# Patient Record
Sex: FEMALE | Race: WHITE | NOT HISPANIC OR LATINO | Employment: UNEMPLOYED | URBAN - METROPOLITAN AREA
[De-identification: names, ages, dates, MRNs, and addresses within clinical notes are randomized per-mention and may not be internally consistent; named-entity substitution may affect disease eponyms.]

---

## 2023-09-26 PROBLEM — N32.81 URGENCY-FREQUENCY SYNDROME: Status: ACTIVE | Noted: 2023-09-26

## 2023-09-26 PROBLEM — R33.9 INCOMPLETE EMPTYING OF BLADDER: Status: ACTIVE | Noted: 2023-09-26

## 2023-09-26 PROBLEM — R59.0 CERVICAL LYMPHADENOPATHY: Status: ACTIVE | Noted: 2023-09-26

## 2023-09-26 PROBLEM — R11.0 NAUSEA IN ADULT: Status: ACTIVE | Noted: 2023-09-26

## 2023-09-26 PROBLEM — K21.9 GERD (GASTROESOPHAGEAL REFLUX DISEASE): Status: ACTIVE | Noted: 2023-09-26

## 2023-09-26 PROBLEM — K80.20 CHOLELITHIASIS: Status: ACTIVE | Noted: 2023-09-26

## 2023-09-26 PROBLEM — R10.11 INTERMITTENT RIGHT UPPER QUADRANT ABDOMINAL PAIN: Status: ACTIVE | Noted: 2023-09-26

## 2023-09-26 PROBLEM — R74.8 ELEVATED LIVER ENZYMES: Status: ACTIVE | Noted: 2023-09-26

## 2023-09-26 PROBLEM — R13.10 DYSPHAGIA: Status: ACTIVE | Noted: 2023-09-26

## 2023-09-26 PROBLEM — R91.8 MULTIPLE LUNG NODULES: Status: ACTIVE | Noted: 2023-09-26

## 2023-09-26 PROBLEM — R10.13 EPIGASTRIC PAIN: Status: ACTIVE | Noted: 2023-09-26

## 2023-09-26 PROBLEM — F17.210 CIGARETTE SMOKER: Status: ACTIVE | Noted: 2023-09-26

## 2023-09-26 PROBLEM — R07.9 CHEST PAIN: Status: ACTIVE | Noted: 2023-09-26

## 2023-09-26 PROBLEM — Z87.19 HX OF DUODENAL ULCER: Status: ACTIVE | Noted: 2023-09-26

## 2023-09-26 PROBLEM — R21 RASH, SKIN: Status: ACTIVE | Noted: 2023-09-26

## 2023-09-26 PROBLEM — R14.3 FLATULENCE: Status: ACTIVE | Noted: 2023-09-26

## 2023-09-26 PROBLEM — L02.91 ABSCESS: Status: ACTIVE | Noted: 2023-09-26

## 2023-09-26 PROBLEM — Z86.0100 PERSONAL HISTORY OF COLONIC POLYPS: Status: ACTIVE | Noted: 2023-09-26

## 2023-09-26 PROBLEM — R31.9 HEMATURIA: Status: ACTIVE | Noted: 2023-09-26

## 2023-09-26 PROBLEM — K59.09 CHRONIC CONSTIPATION: Status: ACTIVE | Noted: 2023-09-26

## 2023-09-26 PROBLEM — Z86.010 PERSONAL HISTORY OF COLONIC POLYPS: Status: ACTIVE | Noted: 2023-09-26

## 2023-09-26 PROBLEM — R06.02 SHORTNESS OF BREATH: Status: ACTIVE | Noted: 2023-09-26

## 2023-09-26 PROBLEM — B18.2 CHRONIC HEPATITIS C (MULTI): Status: ACTIVE | Noted: 2023-09-26

## 2023-09-26 PROBLEM — R10.13 INTRACTABLE EPIGASTRIC ABDOMINAL PAIN: Status: ACTIVE | Noted: 2023-09-26

## 2023-09-26 RX ORDER — ALBUTEROL SULFATE 90 UG/1
1-2 AEROSOL, METERED RESPIRATORY (INHALATION) EVERY 4 HOURS PRN
COMMUNITY
End: 2024-02-13 | Stop reason: ALTCHOICE

## 2023-09-26 RX ORDER — ARIPIPRAZOLE 5 MG/1
TABLET ORAL
COMMUNITY
Start: 2017-06-18

## 2023-09-26 RX ORDER — ACETAMINOPHEN 325 MG/1
TABLET ORAL
COMMUNITY
End: 2024-02-13 | Stop reason: ALTCHOICE

## 2023-09-26 RX ORDER — WHEAT DEXTRIN 3 G/3.5 G
POWDER IN PACKET (EA) ORAL
COMMUNITY
Start: 2021-12-29 | End: 2024-02-13 | Stop reason: ALTCHOICE

## 2023-09-26 RX ORDER — GABAPENTIN 300 MG/1
1 CAPSULE ORAL 3 TIMES DAILY
COMMUNITY
End: 2024-02-13 | Stop reason: ALTCHOICE

## 2023-09-26 RX ORDER — OMEPRAZOLE 40 MG/1
40 CAPSULE, DELAYED RELEASE ORAL
COMMUNITY
Start: 2021-12-29 | End: 2024-02-13 | Stop reason: ALTCHOICE

## 2023-09-26 RX ORDER — VENLAFAXINE HYDROCHLORIDE 150 MG/1
300 CAPSULE, EXTENDED RELEASE ORAL
COMMUNITY

## 2023-09-26 RX ORDER — ARIPIPRAZOLE 15 MG/1
1 TABLET ORAL DAILY
COMMUNITY

## 2023-09-26 RX ORDER — CHLORHEXIDINE GLUCONATE ORAL RINSE 1.2 MG/ML
15 SOLUTION DENTAL AS NEEDED
COMMUNITY
End: 2024-02-13 | Stop reason: ALTCHOICE

## 2023-09-26 RX ORDER — CYCLOBENZAPRINE HCL 10 MG
10 TABLET ORAL NIGHTLY
COMMUNITY

## 2023-09-26 RX ORDER — CYCLOBENZAPRINE HCL 5 MG
5 TABLET ORAL 3 TIMES DAILY
COMMUNITY
End: 2024-03-08 | Stop reason: HOSPADM

## 2023-09-26 RX ORDER — LIDOCAINE HYDROCHLORIDE 20 MG/ML
5 SOLUTION OROPHARYNGEAL 4 TIMES DAILY
COMMUNITY
Start: 2022-07-21 | End: 2024-02-13 | Stop reason: ALTCHOICE

## 2023-09-26 RX ORDER — IBUPROFEN 800 MG/1
800 TABLET ORAL 3 TIMES DAILY
COMMUNITY

## 2023-09-26 RX ORDER — UMECLIDINIUM BROMIDE AND VILANTEROL TRIFENATATE 62.5; 25 UG/1; UG/1
1 POWDER RESPIRATORY (INHALATION) DAILY
COMMUNITY
End: 2024-02-13 | Stop reason: ALTCHOICE

## 2023-09-26 RX ORDER — POLYETHYLENE GLYCOL 3350 17 G/17G
17 POWDER, FOR SOLUTION ORAL DAILY
COMMUNITY
Start: 2021-12-29

## 2023-09-26 RX ORDER — GABAPENTIN 600 MG/1
600 TABLET ORAL 3 TIMES DAILY
COMMUNITY

## 2023-10-02 ENCOUNTER — HOSPITAL ENCOUNTER (OUTPATIENT)
Dept: GASTROENTEROLOGY | Facility: HOSPITAL | Age: 48
Discharge: HOME | End: 2023-10-02
Payer: COMMERCIAL

## 2023-10-02 ENCOUNTER — ANESTHESIA (OUTPATIENT)
Dept: GASTROENTEROLOGY | Facility: HOSPITAL | Age: 48
End: 2023-10-02
Payer: COMMERCIAL

## 2023-10-02 ENCOUNTER — ANESTHESIA EVENT (OUTPATIENT)
Dept: GASTROENTEROLOGY | Facility: HOSPITAL | Age: 48
End: 2023-10-02
Payer: COMMERCIAL

## 2023-10-02 VITALS
SYSTOLIC BLOOD PRESSURE: 105 MMHG | OXYGEN SATURATION: 96 % | BODY MASS INDEX: 28.79 KG/M2 | DIASTOLIC BLOOD PRESSURE: 60 MMHG | RESPIRATION RATE: 16 BRPM | HEART RATE: 61 BPM | TEMPERATURE: 97.1 F | WEIGHT: 190 LBS | HEIGHT: 68 IN

## 2023-10-02 DIAGNOSIS — K21.9 GASTRO-ESOPHAGEAL REFLUX DISEASE WITHOUT ESOPHAGITIS: Primary | ICD-10-CM

## 2023-10-02 PROBLEM — R10.13 ABDOMINAL PAIN, ACUTE, EPIGASTRIC: Status: ACTIVE | Noted: 2023-10-02

## 2023-10-02 PROCEDURE — 2720000007 HC OR 272 NO HCPCS

## 2023-10-02 PROCEDURE — 2500000005 HC RX 250 GENERAL PHARMACY W/O HCPCS: Performed by: NURSE ANESTHETIST, CERTIFIED REGISTERED

## 2023-10-02 PROCEDURE — 7100000009 HC PHASE TWO TIME - INITIAL BASE CHARGE

## 2023-10-02 PROCEDURE — 88305 TISSUE EXAM BY PATHOLOGIST: CPT | Mod: TC,SUR,PORLAB | Performed by: SURGERY

## 2023-10-02 PROCEDURE — 3700000002 HC GENERAL ANESTHESIA TIME - EACH INCREMENTAL 1 MINUTE

## 2023-10-02 PROCEDURE — 88305 TISSUE EXAM BY PATHOLOGIST: CPT | Performed by: PATHOLOGY

## 2023-10-02 PROCEDURE — 2500000004 HC RX 250 GENERAL PHARMACY W/ HCPCS (ALT 636 FOR OP/ED): Performed by: NURSE ANESTHETIST, CERTIFIED REGISTERED

## 2023-10-02 PROCEDURE — 3700000001 HC GENERAL ANESTHESIA TIME - INITIAL BASE CHARGE

## 2023-10-02 PROCEDURE — 7100000010 HC PHASE TWO TIME - EACH INCREMENTAL 1 MINUTE

## 2023-10-02 PROCEDURE — 43239 EGD BIOPSY SINGLE/MULTIPLE: CPT | Performed by: SURGERY

## 2023-10-02 RX ORDER — FENTANYL CITRATE 50 UG/ML
INJECTION, SOLUTION INTRAMUSCULAR; INTRAVENOUS AS NEEDED
Status: DISCONTINUED | OUTPATIENT
Start: 2023-10-02 | End: 2023-10-02

## 2023-10-02 RX ORDER — SODIUM CHLORIDE, SODIUM LACTATE, POTASSIUM CHLORIDE, CALCIUM CHLORIDE 600; 310; 30; 20 MG/100ML; MG/100ML; MG/100ML; MG/100ML
20 INJECTION, SOLUTION INTRAVENOUS CONTINUOUS
Status: DISCONTINUED | OUTPATIENT
Start: 2023-10-02 | End: 2023-10-10

## 2023-10-02 RX ORDER — LIDOCAINE HYDROCHLORIDE 20 MG/ML
INJECTION, SOLUTION INFILTRATION; PERINEURAL AS NEEDED
Status: DISCONTINUED | OUTPATIENT
Start: 2023-10-02 | End: 2023-10-02

## 2023-10-02 RX ORDER — PROPOFOL 10 MG/ML
INJECTION, EMULSION INTRAVENOUS AS NEEDED
Status: DISCONTINUED | OUTPATIENT
Start: 2023-10-02 | End: 2023-10-02

## 2023-10-02 RX ADMIN — PROPOFOL 30 MG: 10 INJECTION, EMULSION INTRAVENOUS at 13:04

## 2023-10-02 RX ADMIN — SODIUM CHLORIDE: 9 INJECTION, SOLUTION INTRAVENOUS at 12:58

## 2023-10-02 RX ADMIN — PROPOFOL 50 MG: 10 INJECTION, EMULSION INTRAVENOUS at 13:01

## 2023-10-02 RX ADMIN — FENTANYL CITRATE 100 MCG: 50 INJECTION, SOLUTION INTRAMUSCULAR; INTRAVENOUS at 13:01

## 2023-10-02 RX ADMIN — LIDOCAINE HYDROCHLORIDE 100 ML: 20 INJECTION, SOLUTION INFILTRATION; PERINEURAL at 13:01

## 2023-10-02 RX ADMIN — PROPOFOL 20 MG: 10 INJECTION, EMULSION INTRAVENOUS at 13:06

## 2023-10-02 SDOH — HEALTH STABILITY: MENTAL HEALTH: CURRENT SMOKER: 1

## 2023-10-02 ASSESSMENT — PAIN SCALES - GENERAL
PAINLEVEL_OUTOF10: 0 - NO PAIN
PAINLEVEL_OUTOF10: 0 - NO PAIN
PAIN_LEVEL: 0
PAINLEVEL_OUTOF10: 0 - NO PAIN

## 2023-10-02 ASSESSMENT — PAIN - FUNCTIONAL ASSESSMENT
PAIN_FUNCTIONAL_ASSESSMENT: 0-10

## 2023-10-02 ASSESSMENT — COLUMBIA-SUICIDE SEVERITY RATING SCALE - C-SSRS
6. HAVE YOU EVER DONE ANYTHING, STARTED TO DO ANYTHING, OR PREPARED TO DO ANYTHING TO END YOUR LIFE?: NO
2. HAVE YOU ACTUALLY HAD ANY THOUGHTS OF KILLING YOURSELF?: NO
6. HAVE YOU EVER DONE ANYTHING, STARTED TO DO ANYTHING, OR PREPARED TO DO ANYTHING TO END YOUR LIFE?: YES
1. IN THE PAST MONTH, HAVE YOU WISHED YOU WERE DEAD OR WISHED YOU COULD GO TO SLEEP AND NOT WAKE UP?: NO

## 2023-10-02 NOTE — ANESTHESIA PREPROCEDURE EVALUATION
Patient: Jaya Billy    Procedure Information       Anesthesia Start Date/Time: 10/02/23 1259    Scheduled providers: Beverly Cardenas MD    Procedure: EGD    Location: [facility]            Relevant Problems   Cardiovascular   (+) Chest pain      GI   (+) GERD (gastroesophageal reflux disease)      /Renal   (+) Chronic hepatitis C (CMS/HCC)      Pulmonary   (+) Multiple lung nodules      GI/Hepatic   (+) Cholelithiasis   (+) Chronic hepatitis C (CMS/HCC)      Infectious Disease   (+) Chronic hepatitis C (CMS/HCC)      Other   (+) Cervical lymphadenopathy       Clinical information reviewed:   Tobacco  Allergies  Meds   Med Hx  Surg Hx  OB Status  Fam Hx  Soc   Hx        NPO Detail:  NPO/Void Status  Date of Last Liquid: 10/01/23  Time of Last Liquid: 2000  Date of Last Solid: 10/01/23  Time of Last Solid: 2200         Physical Exam    Airway  Mallampati: III     Cardiovascular   Rhythm: regular  Rate: normal     Dental    Pulmonary    Abdominal            Anesthesia Plan    ASA 2     MAC     The patient is a current smoker.  Patient did not smoke on day of procedure.    intravenous induction   Anesthetic plan and risks discussed with patient.

## 2023-10-02 NOTE — H&P
History Of Present Illness  Jaya Billy is a 47 y.o. female presenting with abd pain.     Past Medical History  She has a past medical history of Anxiety disorder, unspecified, Constipation, Dysphagia, Encounter for gynecological examination (general) (routine) without abnormal findings, Encounter for screening mammogram for malignant neoplasm of breast, Gastric ulcer, Hyperlipidemia, Infectious viral hepatitis, MRSA (methicillin resistant Staphylococcus aureus), Other chronic pain, Other conditions influencing health status, Personal history of diseases of the blood and blood-forming organs and certain disorders involving the immune mechanism, Personal history of other diseases of the digestive system (2021), Personal history of other diseases of the musculoskeletal system and connective tissue, Personal history of other diseases of the musculoskeletal system and connective tissue, Personal history of other diseases of the musculoskeletal system and connective tissue, Personal history of other diseases of the respiratory system, Personal history of other endocrine, nutritional and metabolic disease, Personal history of other mental and behavioral disorders, Rectal bleeding, and SOB (shortness of breath).    Surgical History  She has a past surgical history that includes Hysterectomy (2013); Elbow surgery (2013); Colonoscopy (2013); Other surgical history (2022); Other surgical history (2022); and  section, classic (2017).     Social History  She reports that she has been smoking cigarettes. She has a 13.00 pack-year smoking history. She has never used smokeless tobacco. She reports that she does not currently use alcohol. She reports current drug use. Drug: Marijuana.    Family History  Family History   Problem Relation Name Age of Onset    Depression Mother      Atrial fibrillation Father      Cancer Father      Depression Brother      Other (cardiac  "disorder) Maternal Grandmother      Colon cancer Paternal Grandmother      Diabetes Other uncle         Allergies  Amoxicillin, Ciprofloxacin, Morphine, Naproxen, and Nsaids (non-steroidal anti-inflammatory drug)    Review of Systems     Physical Exam     Last Recorded Vitals  Blood pressure 115/61, pulse 77, temperature 36.1 °C (97 °F), temperature source Temporal, resp. rate 16, height 1.727 m (5' 8\"), weight 86.2 kg (190 lb), SpO2 95 %.    Relevant Results        No results found for this or any previous visit (from the past 24 hour(s)).      Assessment/Plan   Active Problems:    Abdominal pain, acute, epigastric             I spent 15 minutes in the professional and overall care of this patient.      Beverly Cardenas MD    "

## 2023-10-02 NOTE — DISCHARGE INSTRUCTIONS
Patient Instructions after a Colonoscopy & Upper GI      The anesthetics, sedatives or narcotics which were given to you today will be acting in your body for the next 24 hours, so you might feel a little sleepy or groggy.  This feeling should slowly wear off. Carefully read and follow the instructions.     You received sedation today:  - Do not drive or operate any machinery or power tools of any kind.   - No alcoholic beverages today, not even beer or wine.  - Do not make any important decisions or sign any legal documents.  - No over the counter medications that contain alcohol or that may cause drowsiness.  - Do not make any important decisions or sign any legal documents.    While it is common to experience mild to moderate abdominal distention, gas, or belching after your procedure, if any of these symptoms occur following discharge from the GI Lab or within one week of having your procedure, call the [facility] to be advised whether a visit to your nearest Urgent Care or Emergency Department is indicated.  Take this paper with you if you go.     - If you develop an allergic reaction to the medications that were given during your procedure such as difficulty breathing, rash, hives, severe nausea, vomiting or lightheadedness.- If you experience chest pain, shortness of breath, severe abdominal pain, fevers and chills.    -If you develop signs and symptoms of bleeding such as blood in your spit, if your stools turn black, tarry, or bloody    - If you have not urinated within 8 hours following your procedure.- If your IV site becomes painful, red, inflamed, or looks infected.    If you received a biopsy/polypectomy/sphincterotomy the following instructions apply below:    - If you are taking Aspirin containing products, non-steroidal medications or anti-coagulants please follow doctors recommendations. (Examples of these types of medications are: Advil, Arthrotec, Aleve, Coumadin, Ecotrin,  Heparin, Ibuprofen, Indocin, Motrin, Naprosyn, Nuprin, Plavix, Vioxx, and Voltarin, or their generic forms.  This list is not all-inclusive.  Check with your physician or pharmacist before resuming medications.)     - Eat a soft diet today.  Avoid foods that are poorly digested for the next 24 hours.  These foods would include: nuts, beans, lettuce, red meats, and fried foods.      - Start with liquids and advance your diet as tolerated, gradually work up to eating solids.     - Do not have a Barium Study or Enema for one week.    Your physician recommends the additional following instructions:    Colonoscopy: Resume your previous diet, continue your present medications, a repeated colonoscopy will be determined based on pathology result. Return to normal activity tomorrow.           Nurse Signature                                                                        Date___________________                                                                            Patient/Responsible Party Signature                                        Date___________________

## 2023-10-02 NOTE — ANESTHESIA POSTPROCEDURE EVALUATION
Patient: Jaya Billy    Procedure Summary       Date: 10/02/23 Room / Location: [facility]    Anesthesia Start: 1259 Anesthesia Stop: 1312    Procedure: EGD Diagnosis:       Gastro-esophageal reflux disease without esophagitis      Gastro-esophageal reflux disease without esophagitis    Scheduled Providers: Beverly Cardenas MD Responsible Provider: MILTON Barber    Anesthesia Type: MAC ASA Status: 2            Anesthesia Type: MAC    Vitals Value Taken Time   /58 10/02/23 1315   Temp 98.6 10/02/23 1315   Pulse 64 10/02/23 1315   Resp 18 10/02/23 1315   SpO2 93 10/02/23 1315       Anesthesia Post Evaluation    Patient location during evaluation: bedside  Patient participation: complete - patient participated  Level of consciousness: awake  Pain score: 0  Pain management: adequate  Cardiovascular status: acceptable  Respiratory status: acceptable  Hydration status: acceptable        No notable events documented.

## 2023-10-09 LAB
LABORATORY COMMENT REPORT: NORMAL
PATH REPORT.FINAL DX SPEC: NORMAL
PATH REPORT.GROSS SPEC: NORMAL
PATH REPORT.TOTAL CANCER: NORMAL

## 2023-10-10 RX ORDER — SODIUM CHLORIDE 0.9 % (FLUSH) 0.9 %
10 SYRINGE (ML) INJECTION EVERY 12 HOURS SCHEDULED
Status: DISCONTINUED | OUTPATIENT
Start: 2023-10-10 | End: 2023-10-20 | Stop reason: HOSPADM

## 2023-10-10 RX ORDER — SODIUM CHLORIDE 0.9 % (FLUSH) 0.9 %
10 SYRINGE (ML) INJECTION AS NEEDED
Status: DISCONTINUED | OUTPATIENT
Start: 2023-10-10 | End: 2023-10-20 | Stop reason: HOSPADM

## 2024-02-05 PROBLEM — R51.9 ACUTE HEADACHE: Status: ACTIVE | Noted: 2024-02-05

## 2024-02-05 PROBLEM — M54.50 CHRONIC BILATERAL LOW BACK PAIN WITHOUT SCIATICA: Status: ACTIVE | Noted: 2017-11-27

## 2024-02-05 PROBLEM — F41.9 ANXIETY: Status: ACTIVE | Noted: 2022-11-01
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
## 2024-02-05 PROBLEM — M05.741 RHEUMATOID ARTHRITIS INVOLVING BOTH HANDS WITH POSITIVE RHEUMATOID FACTOR (MULTI): Status: ACTIVE | Noted: 2017-07-19
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
## 2024-02-05 PROBLEM — F41.9 ANXIETY AND DEPRESSION: Status: ACTIVE | Noted: 2017-07-19

## 2024-02-05 PROBLEM — H10.30 ACUTE CONJUNCTIVITIS: Status: ACTIVE | Noted: 2024-02-05

## 2024-02-05 PROBLEM — G89.29 CHRONIC BILATERAL LOW BACK PAIN WITHOUT SCIATICA: Status: ACTIVE | Noted: 2017-11-27

## 2024-02-05 PROBLEM — M75.40 IMPINGEMENT SYNDROME OF SHOULDER REGION: Status: ACTIVE | Noted: 2020-07-13

## 2024-02-05 PROBLEM — F32.A ANXIETY AND DEPRESSION: Status: ACTIVE | Noted: 2017-07-19

## 2024-02-05 PROBLEM — M05.742 RHEUMATOID ARTHRITIS INVOLVING BOTH HANDS WITH POSITIVE RHEUMATOID FACTOR (MULTI): Status: ACTIVE | Noted: 2017-07-19

## 2024-02-05 PROBLEM — J42 CHRONIC BRONCHITIS (MULTI): Status: ACTIVE | Noted: 2022-11-30

## 2024-02-05 PROBLEM — M54.2 NECK PAIN: Status: ACTIVE | Noted: 2022-03-15

## 2024-02-05 PROBLEM — M79.7 FIBROMYALGIA: Status: ACTIVE | Noted: 2017-07-19

## 2024-02-05 PROBLEM — J30.1 SEASONAL ALLERGIC RHINITIS DUE TO POLLEN: Status: ACTIVE | Noted: 2018-07-10

## 2024-02-05 PROBLEM — M50.30 DEGENERATION OF INTERVERTEBRAL DISC OF CERVICAL REGION: Status: ACTIVE | Noted: 2021-07-01

## 2024-02-05 PROBLEM — K26.9 DUODENAL ULCER: Status: ACTIVE | Noted: 2024-02-05

## 2024-02-05 PROBLEM — Z86.2 HISTORY OF ANEMIA: Status: ACTIVE | Noted: 2024-02-05

## 2024-02-05 PROBLEM — U07.1 DISEASE DUE TO SEVERE ACUTE RESPIRATORY SYNDROME CORONAVIRUS 2 (SARS-COV-2): Status: ACTIVE | Noted: 2024-02-05

## 2024-02-05 PROBLEM — J01.90 ACUTE SINUSITIS: Status: ACTIVE | Noted: 2024-02-05

## 2024-02-05 PROBLEM — Z86.39 HISTORY OF ELEVATED LIPIDS: Status: ACTIVE | Noted: 2024-02-05

## 2024-02-05 PROBLEM — M75.00 ADHESIVE CAPSULITIS OF SHOULDER: Status: ACTIVE | Noted: 2019-07-23

## 2024-02-05 PROBLEM — G89.29 CHRONIC PAIN: Status: ACTIVE | Noted: 2024-02-05

## 2024-02-05 PROBLEM — Z86.59 HISTORY OF DEPRESSION: Status: ACTIVE | Noted: 2024-02-05

## 2024-02-05 PROBLEM — Z86.19 HISTORY OF HEPATITIS C: Status: ACTIVE | Noted: 2017-07-19

## 2024-02-05 PROBLEM — K08.89 TOOTHACHE: Status: ACTIVE | Noted: 2022-10-09

## 2024-02-05 RX ORDER — TOPIRAMATE 25 MG/1
25 TABLET ORAL 2 TIMES DAILY
COMMUNITY
Start: 2023-06-07 | End: 2024-02-13 | Stop reason: ALTCHOICE

## 2024-02-09 NOTE — PROGRESS NOTES
"Subjective   Patient ID: Jaya iBlly is a 48 y.o. female who presents for New Patient Visit, throat clicking, and Dysphagia.  HPI  This 48-year-old female is being seen today for an evaluation of her head and neck.  Her main complaint is that of a clicking sound that she feels in her lower neck when she swallows.  She has had no difficulties with swallowing and has been no difficulties with pain in her ears.  She does smoke and does have some problems with possible sleep apnea which has been checked on a sleep study.  She has had multiple evaluations in the past by both the GI service as well as by other ENT providers.  Barium swallow and cookie swallows have been done in the past with no ramifications leading to the treatments.  She states however she did have gastric ulcers.  He does smoke on a daily basis.  She is having no pain when she speaks.  She does have some bulging cervical disks.  Review of Systems  A 12 point ROS has been reviewed and are negative for complaint except what is stated in the history of present illness and/or past medical history as noted in the EMR    Allergies   Allergen Reactions    Amoxicillin Hives and Itching    Ciprofloxacin Other     THRUSH    Morphine Other     \"IT DOESNT WORK\"    Naproxen Unknown    Nsaids (Non-Steroidal Anti-Inflammatory Drug) Other     STOMACH ULCERS      Past Medical History:   Diagnosis Date    Anxiety disorder, unspecified     Anxiety    Constipation     Dysphagia     Encounter for gynecological examination (general) (routine) without abnormal findings     Encounter for gynecological examination without abnormal finding    Encounter for screening mammogram for malignant neoplasm of breast     Visit for screening mammogram    Gastric ulcer     Hyperlipidemia     Infectious viral hepatitis     MRSA (methicillin resistant Staphylococcus aureus)     Other chronic pain     Chronic pain    Other conditions influencing health status     Traumatic Fracture " "   Personal history of diseases of the blood and blood-forming organs and certain disorders involving the immune mechanism     History of anemia    Personal history of other diseases of the digestive system 2021    History of duodenal ulcer    Personal history of other diseases of the musculoskeletal system and connective tissue     History of fibromyalgia    Personal history of other diseases of the musculoskeletal system and connective tissue     History of degenerative disc disease    Personal history of other diseases of the musculoskeletal system and connective tissue     History of rheumatoid arthritis    Personal history of other diseases of the respiratory system     Personal history of asthma    Personal history of other endocrine, nutritional and metabolic disease     History of hyperlipidemia    Personal history of other mental and behavioral disorders     History of depression    Rectal bleeding     SOB (shortness of breath)       Past Surgical History:   Procedure Laterality Date     SECTION, CLASSIC  2017     Section    COLONOSCOPY  2013    Complete Colonoscopy    ELBOW SURGERY  2013    Elbow Surgery    HYSTERECTOMY  2013    Hysterectomy    OTHER SURGICAL HISTORY  2022    Oral surgery    OTHER SURGICAL HISTORY  2022    Shoulder surgery      Objective  Ht 1.727 m (5' 8\")   Wt 83 kg (183 lb)   BMI 27.83 kg/m²     Physical Exam  Examination:    CONSTITUTIONAL: Alert, in no acute distress, normal pitch/clarity of voice, well-developed, well-nourished, cooperative.  HEAD/FACE: Normocephalic, atraumatic, no tenderness over the sinuses, facial strength and movement symmetric.    SKIN: Good turgor, no rashes, no suspicious lesions, in the head and neck.    EYES: Both eyes have normal extraocular movements with no nystagmus, pupils are equal and reactive to light and accommodation, conjunctiva is clear.    EARS: Both ears are negative for external skin " abnormalities, external auditory canals are without lesions or signs of inflammation, tympanic membranes are intact and are of normal color and texture, no effusions are seen, light reflexes normal, no mastoid tenderness is noted to palpation, objective hearing is intact.    NOSE: No external skin lesions are noted, ring piercings in her right side of her nose are noted.  She does have mucosal changes consistent with smoking , nares are patent, septum is intact with septal deviation to the right and noninflamed, nasal turbinates are normal in appearance, sinuses are nontender to palpation bilaterally, no internal lesions or polyps are noted, no discharge is noted.    OROPHARYNX/ORAL CAVITY: Mucous membranes of the oropharynx and the oral cavity proper are without lesions or ulcerations, tongue mobility is normal and no lesions are noted, gingiva and alveolar mucosa is intact without lesions, oral mucosa is moist, muscular movement of the palate and gag reflex are normal.    NASOPHARYNX: Mucous membranes are noninflamed and no secretions or lesions are noted.  As per fiberoptic    LARYNX: Smoking-related mucosal inflammation or exudates are noted, arytenoids are normal in appearance and mobility, false vocal cords are without lesions as is the remainder of the supraglottic larynx, true vocal folds are mobile without inflammation or obstructions and no masses or lesions are noted in the endolarynx.  As per fiberoptic    NECK: No lymphadenopathy is palpated, neck is supple with full range of motion, thyroid is without swelling or tenderness, trachea is midline, no neck masses are noted.    Lymphatics: No cervical adenopathy or supraclavicular adenopathy noted to palpation.    HEART/VASCULAR: No jugular venous distention is noted, carotid pulsations are intact with a regular rate and rhythm noted,    PULMONARY: Good air movement with normal inspiratory/expiratory effort is noted, no audible wheezing is  appreciated.    NEUROLOGIC: Alert and oriented, cranial nerves are grossly intact, gait is normal, sensation in the head and neck is intact,    PSYCH: oriented to person, place and time, normal mood and affect.    EXTREMITIES: No motor dysfunction of the upper and lower extremity is noted.    Patient ID: Jaya Billy is a 48 y.o. female.    Laryngoscopy    Date/Time: 2/13/2024 3:31 PM    Performed by: Gabriel Toribio DMD, MD  Authorized by: Gabriel Toribio DMD, MD    Consent:     Consent obtained:  Verbal    Consent given by:  Patient    Risks discussed:  Pain    Alternatives discussed:  No treatment and referral  Procedure details:     Indications: evaluation of larynx and immediate subglottis      Instrument: flexible fiberoptic laryngoscope    Comments:      LARYNGOSCOPY    Indications: Intranasally there was a nasal septal deviation on the right-hand side in the mid aspect of the nose.  Smokers related mucosal changes were noted diffusely on both sides.  Turbinates were larger on the left than on the right.  No purulent nasal discharge is noted consistent with infection.  The nasopharynx was patent with no lesions.  Both eustachian tube openings were clear.  Palatal area was negative for obstruction and there did not appear to be any irritation along the walls of the pharynx.  Base of the tongue revealed a mild coating.  No ulcerations were seen in the oropharynx or nasopharynx or hypopharynx.  Vocal cord movement was intact although there is surface irritation consistent with smoking no lesions were identified.  Subglottic area appeared to be clear.  There did not appear to be any secretions in the postcricoid area or opening of the piriforms.  Briones's maneuver was positive with some collapsibility of the of the oropharynx and base of tongue.    Consent:  The procedure was discussed including the possible risks and benefits as well as alternative treatments with the patient. Verbal consent was  obtained.    Procedure:  Topical Carlito-Synephrine and 4% lidocaine  was applied as a decongestant and anesthetic nasally. A fiberoptic laryngoscope is inserted nasally and the upper aerodigestive tract is examined.    Findings:    Post procedure:  The patient tolerated the procedure well without complications.    Assessment/Plan   Problem List Items Addressed This Visit             ICD-10-CM    GERD (gastroesophageal reflux disease) K21.9    Degeneration of intervertebral disc of cervical region M50.30    Rheumatoid arthritis involving both hands with positive rheumatoid factor (CMS/[facility]) M05.741, M05.742    Upper airway resistance syndrome G47.8     Other Visit Diagnoses         Codes    Tobacco abuse    -  Primary Z72.0          I discussed the clinical finds with the patient.  Her exam today was negative for any signs of infection in the upper aerodigestive tract although she does have inflammation of mucous membrane surfaces from her chronic tobacco use.  The sound that she hears with swallowing seems consistent with laryngeal crepitus which could also be associated with some cervical crepitus from her intervertebral disc problems.  This is not a health risk to her and may be intermittent in nature.  She does have symptoms that suggest obstructive sleep apnea and I do agree with her having a sleep study done to check on that.  She needs to make some commitment in regards to her smoking habit since it will only 8 intensify respiratory problems and can exacerbate underlying GI problems.  She has had a GI workup done I did suggest that she try Pepcid at least as a acid inhibitor.  Nasal saline washes of the nose were recommended in order to minimize postnasal discharge.  This might also help in averting a tendency for upper respiratory tract infections.  Using distilled or purified water to mix the saline was recommended.  If she has any difficulties developing in these areas she should contact the office for recheck.   I explained the anatomy to her in some detail during her visit.     Gabriel Toribio DMD, MD 02/13/24 3:40 PM

## 2024-02-13 ENCOUNTER — OFFICE VISIT (OUTPATIENT)
Dept: OTOLARYNGOLOGY | Facility: CLINIC | Age: 49
End: 2024-02-13
Payer: COMMERCIAL

## 2024-02-13 VITALS — HEIGHT: 68 IN | WEIGHT: 183 LBS | BODY MASS INDEX: 27.74 KG/M2

## 2024-02-13 DIAGNOSIS — M05.741 RHEUMATOID ARTHRITIS INVOLVING BOTH HANDS WITH POSITIVE RHEUMATOID FACTOR (MULTI): ICD-10-CM

## 2024-02-13 DIAGNOSIS — M50.30 DEGENERATION OF INTERVERTEBRAL DISC OF CERVICAL REGION: ICD-10-CM

## 2024-02-13 DIAGNOSIS — K21.9 GASTROESOPHAGEAL REFLUX DISEASE WITHOUT ESOPHAGITIS: ICD-10-CM

## 2024-02-13 DIAGNOSIS — M05.742 RHEUMATOID ARTHRITIS INVOLVING BOTH HANDS WITH POSITIVE RHEUMATOID FACTOR (MULTI): ICD-10-CM

## 2024-02-13 DIAGNOSIS — G47.8 UPPER AIRWAY RESISTANCE SYNDROME: ICD-10-CM

## 2024-02-13 DIAGNOSIS — Z72.0 TOBACCO ABUSE: Primary | ICD-10-CM

## 2024-02-13 PROBLEM — M24.80 CERVICAL SPINE CREPITUS: Status: ACTIVE | Noted: 2024-02-13

## 2024-02-13 PROCEDURE — 31575 DIAGNOSTIC LARYNGOSCOPY: CPT | Performed by: OTOLARYNGOLOGY

## 2024-02-13 PROCEDURE — 99204 OFFICE O/P NEW MOD 45 MIN: CPT | Performed by: OTOLARYNGOLOGY

## 2024-02-13 RX ORDER — HYDROXYZINE HYDROCHLORIDE 10 MG/1
TABLET, FILM COATED ORAL
COMMUNITY
Start: 2024-02-01

## 2024-03-01 ENCOUNTER — ANESTHESIA EVENT (OUTPATIENT)
Dept: OPERATING ROOM | Facility: HOSPITAL | Age: 49
End: 2024-03-01
Payer: COMMERCIAL

## 2024-03-06 ENCOUNTER — APPOINTMENT (OUTPATIENT)
Dept: RADIOLOGY | Facility: HOSPITAL | Age: 49
End: 2024-03-06
Payer: COMMERCIAL

## 2024-03-06 ENCOUNTER — PHARMACY VISIT (OUTPATIENT)
Dept: PHARMACY | Facility: CLINIC | Age: 49
End: 2024-03-06
Payer: MEDICAID

## 2024-03-06 ENCOUNTER — HOSPITAL ENCOUNTER (OUTPATIENT)
Facility: HOSPITAL | Age: 49
Setting detail: OUTPATIENT SURGERY
Discharge: HOME | End: 2024-03-06
Attending: SURGERY | Admitting: SURGERY
Payer: COMMERCIAL

## 2024-03-06 ENCOUNTER — APPOINTMENT (OUTPATIENT)
Dept: CARDIOLOGY | Facility: HOSPITAL | Age: 49
End: 2024-03-06
Payer: COMMERCIAL

## 2024-03-06 ENCOUNTER — ANESTHESIA (OUTPATIENT)
Dept: OPERATING ROOM | Facility: HOSPITAL | Age: 49
End: 2024-03-06
Payer: COMMERCIAL

## 2024-03-06 VITALS
DIASTOLIC BLOOD PRESSURE: 70 MMHG | SYSTOLIC BLOOD PRESSURE: 109 MMHG | HEART RATE: 70 BPM | RESPIRATION RATE: 14 BRPM | OXYGEN SATURATION: 94 % | TEMPERATURE: 97.4 F

## 2024-03-06 DIAGNOSIS — K80.20 SYMPTOMATIC CHOLELITHIASIS: ICD-10-CM

## 2024-03-06 LAB
ANION GAP SERPL CALC-SCNC: 9 MMOL/L (ref 10–20)
ATRIAL RATE: 72 BPM
BUN SERPL-MCNC: 19 MG/DL (ref 6–23)
CALCIUM SERPL-MCNC: 9.1 MG/DL (ref 8.6–10.3)
CHLORIDE SERPL-SCNC: 107 MMOL/L (ref 98–107)
CO2 SERPL-SCNC: 30 MMOL/L (ref 21–32)
CREAT SERPL-MCNC: 1.04 MG/DL (ref 0.5–1.05)
EGFRCR SERPLBLD CKD-EPI 2021: 67 ML/MIN/1.73M*2
ERYTHROCYTE [DISTWIDTH] IN BLOOD BY AUTOMATED COUNT: 13.2 % (ref 11.5–14.5)
GLUCOSE SERPL-MCNC: 95 MG/DL (ref 74–99)
HCT VFR BLD AUTO: 42.4 % (ref 36–46)
HGB BLD-MCNC: 13.7 G/DL (ref 12–16)
MCH RBC QN AUTO: 29.8 PG (ref 26–34)
MCHC RBC AUTO-ENTMCNC: 32.3 G/DL (ref 32–36)
MCV RBC AUTO: 92 FL (ref 80–100)
NRBC BLD-RTO: 0 /100 WBCS (ref 0–0)
P AXIS: 45 DEGREES
PLATELET # BLD AUTO: 216 X10*3/UL (ref 150–450)
POTASSIUM SERPL-SCNC: 4.7 MMOL/L (ref 3.5–5.3)
PR INTERVAL: 133 MS
Q ONSET: 254 MS
QRS COUNT: 12 BEATS
QRS DURATION: 78 MS
QT INTERVAL: 381 MS
QTC CALCULATION(BAZETT): 420 MS
QTC FREDERICIA: 407 MS
R AXIS: -6 DEGREES
RBC # BLD AUTO: 4.59 X10*6/UL (ref 4–5.2)
SODIUM SERPL-SCNC: 141 MMOL/L (ref 136–145)
T AXIS: 49 DEGREES
T OFFSET: 444 MS
VENTRICULAR RATE: 73 BPM
WBC # BLD AUTO: 8 X10*3/UL (ref 4.4–11.3)

## 2024-03-06 PROCEDURE — 2500000004 HC RX 250 GENERAL PHARMACY W/ HCPCS (ALT 636 FOR OP/ED): Performed by: NURSE ANESTHETIST, CERTIFIED REGISTERED

## 2024-03-06 PROCEDURE — 3700000001 HC GENERAL ANESTHESIA TIME - INITIAL BASE CHARGE: Performed by: SURGERY

## 2024-03-06 PROCEDURE — 93010 ELECTROCARDIOGRAM REPORT: CPT | Performed by: INTERNAL MEDICINE

## 2024-03-06 PROCEDURE — 7100000001 HC RECOVERY ROOM TIME - INITIAL BASE CHARGE: Performed by: SURGERY

## 2024-03-06 PROCEDURE — 88304 TISSUE EXAM BY PATHOLOGIST: CPT

## 2024-03-06 PROCEDURE — 3700000002 HC GENERAL ANESTHESIA TIME - EACH INCREMENTAL 1 MINUTE: Performed by: SURGERY

## 2024-03-06 PROCEDURE — 36415 COLL VENOUS BLD VENIPUNCTURE: CPT | Performed by: ANESTHESIOLOGY

## 2024-03-06 PROCEDURE — 2500000001 HC RX 250 WO HCPCS SELF ADMINISTERED DRUGS (ALT 637 FOR MEDICARE OP): Performed by: ANESTHESIOLOGY

## 2024-03-06 PROCEDURE — 80048 BASIC METABOLIC PNL TOTAL CA: CPT | Performed by: ANESTHESIOLOGY

## 2024-03-06 PROCEDURE — C1729 CATH, DRAINAGE: HCPCS | Performed by: SURGERY

## 2024-03-06 PROCEDURE — 85027 COMPLETE CBC AUTOMATED: CPT | Performed by: ANESTHESIOLOGY

## 2024-03-06 PROCEDURE — 3600000003 HC OR TIME - INITIAL BASE CHARGE - PROCEDURE LEVEL THREE: Performed by: SURGERY

## 2024-03-06 PROCEDURE — RXMED WILLOW AMBULATORY MEDICATION CHARGE

## 2024-03-06 PROCEDURE — 2550000001 HC RX 255 CONTRASTS: Performed by: SURGERY

## 2024-03-06 PROCEDURE — 88304 TISSUE EXAM BY PATHOLOGIST: CPT | Mod: TC,PORLAB | Performed by: SURGERY

## 2024-03-06 PROCEDURE — 74300 X-RAY BILE DUCTS/PANCREAS: CPT

## 2024-03-06 PROCEDURE — 2500000004 HC RX 250 GENERAL PHARMACY W/ HCPCS (ALT 636 FOR OP/ED): Performed by: ANESTHESIOLOGY

## 2024-03-06 PROCEDURE — 7100000010 HC PHASE TWO TIME - EACH INCREMENTAL 1 MINUTE: Performed by: SURGERY

## 2024-03-06 PROCEDURE — 93005 ELECTROCARDIOGRAM TRACING: CPT | Mod: 59

## 2024-03-06 PROCEDURE — 2720000007 HC OR 272 NO HCPCS: Performed by: SURGERY

## 2024-03-06 PROCEDURE — 2500000004 HC RX 250 GENERAL PHARMACY W/ HCPCS (ALT 636 FOR OP/ED): Performed by: SURGERY

## 2024-03-06 PROCEDURE — 7100000002 HC RECOVERY ROOM TIME - EACH INCREMENTAL 1 MINUTE: Performed by: SURGERY

## 2024-03-06 PROCEDURE — 7100000009 HC PHASE TWO TIME - INITIAL BASE CHARGE: Performed by: SURGERY

## 2024-03-06 PROCEDURE — 3600000008 HC OR TIME - EACH INCREMENTAL 1 MINUTE - PROCEDURE LEVEL THREE: Performed by: SURGERY

## 2024-03-06 PROCEDURE — 2500000005 HC RX 250 GENERAL PHARMACY W/O HCPCS: Performed by: NURSE ANESTHETIST, CERTIFIED REGISTERED

## 2024-03-06 RX ORDER — BUPIVACAINE HYDROCHLORIDE 2.5 MG/ML
INJECTION, SOLUTION EPIDURAL; INFILTRATION; INTRACAUDAL AS NEEDED
Status: DISCONTINUED | OUTPATIENT
Start: 2024-03-06 | End: 2024-03-06 | Stop reason: HOSPADM

## 2024-03-06 RX ORDER — DEXAMETHASONE SODIUM PHOSPHATE 4 MG/ML
8 INJECTION, SOLUTION INTRA-ARTICULAR; INTRALESIONAL; INTRAMUSCULAR; INTRAVENOUS; SOFT TISSUE ONCE
Status: COMPLETED | OUTPATIENT
Start: 2024-03-06 | End: 2024-03-06

## 2024-03-06 RX ORDER — ONDANSETRON HYDROCHLORIDE 2 MG/ML
4 INJECTION, SOLUTION INTRAVENOUS ONCE AS NEEDED
Status: DISCONTINUED | OUTPATIENT
Start: 2024-03-06 | End: 2024-03-08 | Stop reason: HOSPADM

## 2024-03-06 RX ORDER — HYDROCODONE BITARTRATE AND ACETAMINOPHEN 5; 325 MG/1; MG/1
1 TABLET ORAL EVERY 6 HOURS PRN
Qty: 12 TABLET | Refills: 0 | Status: SHIPPED | OUTPATIENT
Start: 2024-03-06 | End: 2024-03-13

## 2024-03-06 RX ORDER — LIDOCAINE HYDROCHLORIDE 20 MG/ML
INJECTION, SOLUTION INFILTRATION; PERINEURAL AS NEEDED
Status: DISCONTINUED | OUTPATIENT
Start: 2024-03-06 | End: 2024-03-06

## 2024-03-06 RX ORDER — FAMOTIDINE 10 MG/ML
20 INJECTION INTRAVENOUS ONCE
Status: COMPLETED | OUTPATIENT
Start: 2024-03-06 | End: 2024-03-06

## 2024-03-06 RX ORDER — ONDANSETRON HYDROCHLORIDE 2 MG/ML
4 INJECTION, SOLUTION INTRAVENOUS ONCE
Status: COMPLETED | OUTPATIENT
Start: 2024-03-06 | End: 2024-03-06

## 2024-03-06 RX ORDER — MIDAZOLAM HYDROCHLORIDE 1 MG/ML
INJECTION, SOLUTION INTRAMUSCULAR; INTRAVENOUS AS NEEDED
Status: DISCONTINUED | OUTPATIENT
Start: 2024-03-06 | End: 2024-03-06

## 2024-03-06 RX ORDER — ROCURONIUM BROMIDE 10 MG/ML
INJECTION, SOLUTION INTRAVENOUS AS NEEDED
Status: DISCONTINUED | OUTPATIENT
Start: 2024-03-06 | End: 2024-03-06

## 2024-03-06 RX ORDER — FENTANYL CITRATE 50 UG/ML
INJECTION, SOLUTION INTRAMUSCULAR; INTRAVENOUS AS NEEDED
Status: DISCONTINUED | OUTPATIENT
Start: 2024-03-06 | End: 2024-03-06

## 2024-03-06 RX ORDER — CLINDAMYCIN PHOSPHATE 900 MG/50ML
INJECTION, SOLUTION INTRAVENOUS AS NEEDED
Status: DISCONTINUED | OUTPATIENT
Start: 2024-03-06 | End: 2024-03-06

## 2024-03-06 RX ORDER — METOCLOPRAMIDE HYDROCHLORIDE 5 MG/ML
10 INJECTION INTRAMUSCULAR; INTRAVENOUS ONCE
Status: COMPLETED | OUTPATIENT
Start: 2024-03-06 | End: 2024-03-06

## 2024-03-06 RX ORDER — SODIUM CITRATE AND CITRIC ACID MONOHYDRATE 334; 500 MG/5ML; MG/5ML
30 SOLUTION ORAL ONCE
Status: COMPLETED | OUTPATIENT
Start: 2024-03-06 | End: 2024-03-06

## 2024-03-06 RX ORDER — IPRATROPIUM BROMIDE AND ALBUTEROL SULFATE 2.5; .5 MG/3ML; MG/3ML
3 SOLUTION RESPIRATORY (INHALATION) ONCE
Status: DISCONTINUED | OUTPATIENT
Start: 2024-03-06 | End: 2024-03-06 | Stop reason: HOSPADM

## 2024-03-06 RX ORDER — PROPOFOL 10 MG/ML
INJECTION, EMULSION INTRAVENOUS AS NEEDED
Status: DISCONTINUED | OUTPATIENT
Start: 2024-03-06 | End: 2024-03-06

## 2024-03-06 RX ORDER — CLINDAMYCIN PHOSPHATE 900 MG/50ML
900 INJECTION, SOLUTION INTRAVENOUS ONCE
Status: COMPLETED | OUTPATIENT
Start: 2024-03-06 | End: 2024-03-06

## 2024-03-06 RX ORDER — SODIUM CHLORIDE, SODIUM LACTATE, POTASSIUM CHLORIDE, CALCIUM CHLORIDE 600; 310; 30; 20 MG/100ML; MG/100ML; MG/100ML; MG/100ML
50 INJECTION, SOLUTION INTRAVENOUS CONTINUOUS
Status: DISCONTINUED | OUTPATIENT
Start: 2024-03-06 | End: 2024-03-08 | Stop reason: HOSPADM

## 2024-03-06 RX ORDER — ACETAMINOPHEN 325 MG/1
975 TABLET ORAL ONCE
Status: COMPLETED | OUTPATIENT
Start: 2024-03-06 | End: 2024-03-06

## 2024-03-06 RX ORDER — DEXAMETHASONE SODIUM PHOSPHATE 4 MG/ML
INJECTION, SOLUTION INTRA-ARTICULAR; INTRALESIONAL; INTRAMUSCULAR; INTRAVENOUS; SOFT TISSUE AS NEEDED
Status: DISCONTINUED | OUTPATIENT
Start: 2024-03-06 | End: 2024-03-06

## 2024-03-06 RX ADMIN — SODIUM CITRATE AND CITRIC ACID MONOHYDRATE 30 ML: 500; 334 SOLUTION ORAL at 08:04

## 2024-03-06 RX ADMIN — ROCURONIUM BROMIDE 20 MG: 10 INJECTION, SOLUTION INTRAVENOUS at 09:21

## 2024-03-06 RX ADMIN — HYDROMORPHONE HYDROCHLORIDE 0.2 MG: 0.2 INJECTION, SOLUTION INTRAMUSCULAR; INTRAVENOUS; SUBCUTANEOUS at 11:34

## 2024-03-06 RX ADMIN — FAMOTIDINE 20 MG: 10 INJECTION, SOLUTION INTRAVENOUS at 08:04

## 2024-03-06 RX ADMIN — CLINDAMYCIN PHOSPHATE 900 MG: 900 INJECTION, SOLUTION INTRAVENOUS at 08:56

## 2024-03-06 RX ADMIN — ACETAMINOPHEN 975 MG: 325 TABLET ORAL at 08:04

## 2024-03-06 RX ADMIN — MIDAZOLAM 2 MG: 1 INJECTION INTRAMUSCULAR; INTRAVENOUS at 08:55

## 2024-03-06 RX ADMIN — ONDANSETRON 4 MG: 2 INJECTION INTRAMUSCULAR; INTRAVENOUS at 08:04

## 2024-03-06 RX ADMIN — DEXAMETHASONE SODIUM PHOSPHATE 4 MG: 4 INJECTION, SOLUTION INTRAMUSCULAR; INTRAVENOUS at 09:12

## 2024-03-06 RX ADMIN — LIDOCAINE HYDROCHLORIDE 100 MG: 20 INJECTION, SOLUTION INFILTRATION; PERINEURAL at 09:01

## 2024-03-06 RX ADMIN — HYDROMORPHONE HYDROCHLORIDE 0.5 MG: 0.5 INJECTION, SOLUTION INTRAMUSCULAR; INTRAVENOUS; SUBCUTANEOUS at 11:54

## 2024-03-06 RX ADMIN — SUGAMMADEX 200 MG: 100 INJECTION, SOLUTION INTRAVENOUS at 10:33

## 2024-03-06 RX ADMIN — FENTANYL CITRATE 50 MCG: 50 INJECTION INTRAMUSCULAR; INTRAVENOUS at 09:01

## 2024-03-06 RX ADMIN — FENTANYL CITRATE 50 MCG: 50 INJECTION INTRAMUSCULAR; INTRAVENOUS at 09:50

## 2024-03-06 RX ADMIN — DEXAMETHASONE SODIUM PHOSPHATE 8 MG: 4 INJECTION, SOLUTION INTRAMUSCULAR; INTRAVENOUS at 08:04

## 2024-03-06 RX ADMIN — ROCURONIUM BROMIDE 50 MG: 10 INJECTION, SOLUTION INTRAVENOUS at 09:02

## 2024-03-06 RX ADMIN — METOCLOPRAMIDE 10 MG: 5 INJECTION, SOLUTION INTRAMUSCULAR; INTRAVENOUS at 08:04

## 2024-03-06 RX ADMIN — PROPOFOL 200 MG: 10 INJECTION, EMULSION INTRAVENOUS at 09:01

## 2024-03-06 RX ADMIN — FENTANYL CITRATE 50 MCG: 50 INJECTION INTRAMUSCULAR; INTRAVENOUS at 10:21

## 2024-03-06 RX ADMIN — SODIUM CHLORIDE, POTASSIUM CHLORIDE, SODIUM LACTATE AND CALCIUM CHLORIDE 50 ML/HR: 600; 310; 30; 20 INJECTION, SOLUTION INTRAVENOUS at 08:04

## 2024-03-06 SDOH — HEALTH STABILITY: MENTAL HEALTH: CURRENT SMOKER: 1

## 2024-03-06 ASSESSMENT — COLUMBIA-SUICIDE SEVERITY RATING SCALE - C-SSRS
6. HAVE YOU EVER DONE ANYTHING, STARTED TO DO ANYTHING, OR PREPARED TO DO ANYTHING TO END YOUR LIFE?: NO
2. HAVE YOU ACTUALLY HAD ANY THOUGHTS OF KILLING YOURSELF?: NO
1. IN THE PAST MONTH, HAVE YOU WISHED YOU WERE DEAD OR WISHED YOU COULD GO TO SLEEP AND NOT WAKE UP?: NO

## 2024-03-06 ASSESSMENT — PAIN - FUNCTIONAL ASSESSMENT
PAIN_FUNCTIONAL_ASSESSMENT: 0-10
PAIN_FUNCTIONAL_ASSESSMENT: UNABLE TO SELF-REPORT
PAIN_FUNCTIONAL_ASSESSMENT: 0-10
PAIN_FUNCTIONAL_ASSESSMENT: 0-10

## 2024-03-06 ASSESSMENT — PAIN SCALES - GENERAL
PAINLEVEL_OUTOF10: 8
PAIN_LEVEL: 7
PAINLEVEL_OUTOF10: 6

## 2024-03-06 ASSESSMENT — PAIN DESCRIPTION - DESCRIPTORS: DESCRIPTORS: STABBING

## 2024-03-06 NOTE — ANESTHESIA POSTPROCEDURE EVALUATION
Patient: Jaya Billy    Procedure Summary       Date: 03/06/24 Room / Location: [facility] / [facility]    Anesthesia Start: 0855 Anesthesia Stop: 1048    Procedure: Cholecystectomy Laparoscopy with Cholangiogram *C-ARM* (Abdomen) Diagnosis:       Symptomatic cholelithiasis      (Symptomatic cholelithiasis [K80.20])    Surgeons: Beverly Cardenas MD Responsible Provider: MILTON Smart    Anesthesia Type: general ASA Status: 2            Anesthesia Type: general    Vitals Value Taken Time   /66 03/06/24 1212   Temp 36.3 °C (97.4 °F) 03/06/24 1140   Pulse 71 03/06/24 1219   Resp 14 03/06/24 1219   SpO2 95 % 03/06/24 1219   Vitals shown include unvalidated device data.    Anesthesia Post Evaluation    Patient location during evaluation: PACU  Patient participation: complete - patient participated  Level of consciousness: responsive to verbal stimuli, sleepy but conscious and awake  Pain score: 7  Pain management: adequate  Airway patency: patent  Cardiovascular status: acceptable  Respiratory status: acceptable  Hydration status: acceptable  Postoperative Nausea and Vomiting: none  Comments: VITAL SIGNS DO NOT REFLECT INTENSITY OF PAIN PROCLAIMED    There were no known notable events for this encounter.

## 2024-03-06 NOTE — H&P
History Of Present Illness  Jaya Billy is a 48 y.o. female presenting with symptomatic cholelithiasis.     Past Medical History  Past Medical History:   Diagnosis Date    Anxiety disorder, unspecified     Anxiety    Constipation     Dysphagia     Encounter for gynecological examination (general) (routine) without abnormal findings     Encounter for gynecological examination without abnormal finding    Encounter for screening mammogram for malignant neoplasm of breast     Visit for screening mammogram    Gastric ulcer     Hyperlipidemia     Infectious viral hepatitis     MRSA (methicillin resistant Staphylococcus aureus)     Other chronic pain     Chronic pain    Other conditions influencing health status     Traumatic Fracture    Personal history of diseases of the blood and blood-forming organs and certain disorders involving the immune mechanism     History of anemia    Personal history of other diseases of the digestive system 2021    History of duodenal ulcer    Personal history of other diseases of the musculoskeletal system and connective tissue     History of fibromyalgia    Personal history of other diseases of the musculoskeletal system and connective tissue     History of degenerative disc disease    Personal history of other diseases of the musculoskeletal system and connective tissue     History of rheumatoid arthritis    Personal history of other diseases of the respiratory system     Personal history of asthma    Personal history of other endocrine, nutritional and metabolic disease     History of hyperlipidemia    Personal history of other mental and behavioral disorders     History of depression    Rectal bleeding     SOB (shortness of breath)        Surgical History  Past Surgical History:   Procedure Laterality Date     SECTION, CLASSIC  2017     Section    COLONOSCOPY  2013    Complete Colonoscopy    ELBOW SURGERY  2013    Elbow Surgery     HYSTERECTOMY  05/21/2013    Hysterectomy    OTHER SURGICAL HISTORY  01/04/2022    Oral surgery    OTHER SURGICAL HISTORY  01/04/2022    Shoulder surgery        Social History  She reports that she has been smoking cigarettes. She has a 13.00 pack-year smoking history. She has never used smokeless tobacco. She reports that she does not currently use alcohol. She reports current drug use. Drug: Marijuana.    Family History  Family History   Problem Relation Name Age of Onset    Depression Mother      Atrial fibrillation Father      Cancer Father      Depression Brother      Other (cardiac disorder) Maternal Grandmother      Colon cancer Paternal Grandmother      Diabetes Other uncle         Allergies  Amoxicillin, Ciprofloxacin, Morphine, Naproxen, and Nsaids (non-steroidal anti-inflammatory drug)    Review of Systems     Physical Exam  Eyes:      Extraocular Movements: Extraocular movements intact.      Pupils: Pupils are equal, round, and reactive to light.   Cardiovascular:      Rate and Rhythm: Normal rate and regular rhythm.   Pulmonary:      Effort: Pulmonary effort is normal.   Abdominal:      Palpations: Abdomen is soft.   Skin:     General: Skin is warm and dry.   Neurological:      Mental Status: She is alert.   Psychiatric:         Mood and Affect: Mood normal.         Behavior: Behavior normal.          Last Recorded Vitals  Blood pressure 100/59, pulse 72, temperature 36.7 °C (98 °F), temperature source Temporal, resp. rate 16, SpO2 97 %.    Relevant Results             Assessment/Plan   Active Problems:  There are no active Hospital Problems.      Pt here for lap maria elena with ioc possible open.       I spent 20 minutes in the professional and overall care of this patient.      Beverly Cardenas MD

## 2024-03-06 NOTE — ANESTHESIA PROCEDURE NOTES
Airway  Date/Time: 3/6/2024 9:06 AM  Urgency: elective    Airway not difficult    Staffing  Performed: CRNA   Authorized by: MILTON Smart    Performed by: MILTON Smart  Patient location during procedure: OR    Indications and Patient Condition  Indications for airway management: anesthesia  Spontaneous Ventilation: absent  Sedation level: deep  Preoxygenated: yes  Patient position: sniffing  Mask difficulty assessment: 1 - vent by mask  Planned trial extubation    Final Airway Details  Final airway type: endotracheal airway      Successful airway: ETT  Cuffed: yes   Successful intubation technique: video laryngoscopy (Sam)  Facilitating devices/methods: intubating stylet  Endotracheal tube insertion site: oral  Blade: Samira  Blade size: #3  ETT size (mm): 7.5  Cormack-Lehane Classification: grade I - full view of glottis  Placement verified by: chest auscultation and capnometry   Cuff volume (mL): 10  Measured from: lips  ETT to lips (cm): 22  Number of attempts at approach: 1    Additional Comments  Atraumatic intubation.

## 2024-03-06 NOTE — ANESTHESIA PREPROCEDURE EVALUATION
Patient: Jaya Billy    Procedure Information       Date/Time: 03/06/24 0835    Procedure: Cholecystectomy Laparoscopy with Cholangiogram POSSIBLE OPEN *C-ARM* (Abdomen) - 2 HOUR  DX:K80.20    Location: [facility] / [facility]    Surgeons: Beverly Cardenas MD            Relevant Problems   Anesthesia (within normal limits)      Cardiovascular   (+) Chest pain      GI   (+) Duodenal ulcer   (+) GERD (gastroesophageal reflux disease)      /Renal   (+) Chronic hepatitis C (CMS/HCC)      Neuro/Psych   (+) Anxiety   (+) Anxiety and depression      Pulmonary   (+) Chronic bronchitis (CMS/HCC)   (+) Multiple lung nodules      GI/Hepatic   (+) Cholelithiasis   (+) Chronic hepatitis C (CMS/HCC)      Musculoskeletal   (+) Chronic bilateral low back pain without sciatica   (+) Degeneration of intervertebral disc of cervical region   (+) Fibromyalgia   (+) Rheumatoid arthritis involving both hands with positive rheumatoid factor (CMS/HCC)      Infectious Disease   (+) Chronic hepatitis C (CMS/HCC)   (+) Disease due to severe acute respiratory syndrome coronavirus 2 (SARS-CoV-2)      Other   (+) Cervical lymphadenopathy   (+) Rheumatoid arthritis involving both hands with positive rheumatoid factor (CMS/HCC)       Clinical information reviewed:   Tobacco  Allergies  Meds  Problems  Med Hx  Surg Hx  OB Status    Fam Hx  Soc Hx        NPO Detail:  NPO/Void Status  Date of Last Liquid: 03/05/24  Time of Last Liquid: 0600  Date of Last Solid: 03/05/24  Time of Last Solid: 0600         Physical Exam    Airway  Mallampati: II  TM distance: >3 FB  Neck ROM: full     Cardiovascular - normal exam     Dental - normal exam     Pulmonary - normal exam     Abdominal            Anesthesia Plan    History of general anesthesia?: yes  History of complications of general anesthesia?: no    ASA 2     general     The patient is a current smoker.  Patient was previously instructed to abstain from smoking on day of  procedure.  Patient did not smoke on day of procedure.    intravenous induction   Anesthetic plan and risks discussed with patient.  Use of blood products discussed with patient who consented to blood products.    Plan discussed with CRNA.

## 2024-03-06 NOTE — OP NOTE
Cholecystectomy Laparoscopy with Cholangiogram POSSIBLE OPEN *C-ARM* Operative Note     Date: 3/6/2024  OR Location: [facility]    Name: Jaya Billy, : 1975, Age: 48 y.o., MRN: [id number], Sex: female    Diagnosis  Pre-op Diagnosis     * Symptomatic cholelithiasis [K80.20] Post-op Diagnosis     * Symptomatic cholelithiasis [K80.20]     Procedures  Cholecystectomy Laparoscopy with Cholangiogram POSSIBLE OPEN *C-ARM*  62452 - [place] LAPS SURG CHOLECYSTECTOMY W/CHOLANGIOGRAPHY      Surgeons      * Beverly Cardenas - Primary    Resident/Fellow/Other Assistant:  Surgeon(s) and Role:    Procedure Summary  Anesthesia: General  ASA: II  Anesthesia Staff: CRNA: CL Smart-CRNA  Estimated Blood Loss: 2 mL  Intra-op Medications: Administrations occurring from 0835 to 1035 on 24:  * No intraprocedure medications in log *           Anesthesia Record               Intraprocedure I/O Totals          Intake    clindamycin in D5W 900 mg/50 mL 50.00 mL    Total Intake 50 mL          Specimen:   ID Type Source Tests Collected by Time   1 : ---- Tissue GALLBLADDER CHOLECYSTECTOMY SURGICAL PATHOLOGY EXAM Beverly Cardenas MD 3/6/2024 1023        Staff:   Circulator: Kady Porter RN  Scrub Person: Kj Guzman         Drains and/or Catheters: * None in log *    Tourniquet Times:         Implants:     Findings: Stone blocking cystic duct infundibulum with chronic scarring     Indications: Jaya Billy is an 48 y.o. female who is having surgery for Symptomatic cholelithiasis [K80.20]. Chronic cholecystitis with cholelithiasis    The patient was seen in the preoperative area. The risks, benefits, complications, treatment options, non-operative alternatives, expected recovery and outcomes were discussed with the patient. The possibilities of reaction to medication, pulmonary aspiration, injury to surrounding structures, bleeding, recurrent infection, the need for additional procedures, failure to diagnose a  condition, and creating a complication requiring transfusion or operation were discussed with the patient. The patient concurred with the proposed plan, giving informed consent.  The site of surgery was properly noted/marked if necessary per policy. The patient has been actively warmed in preoperative area. Preoperative antibiotics have been ordered and given within 1 hours of incision. Venous thrombosis prophylaxis have been ordered including bilateral sequential compression devices    Procedure Details: Patient was taken to the operating room placed in supine position.  The abdomen was prepped and draped after patient was placed under general anesthesia and endotracheally intubated.  The area below the umbilicus was localized with Marcaine.  Incision was made, carried down sharply through skin and subcutaneous tissue to the fascia.  Fascia was opened O Vicryl stay sutures placed on either side.  Abdomen was entered, Jang trocar was inserted and insufflated 15 cm CO2.  Patient was placed in reverse Trendelenburg, airplaned to the left.  One subxiphoid 10 mm port was placed under direct vision.  Two 5 mm ports were placed in the subcostal area on the right.  The gallbladder was visualized, was held anterior superior laterally at the apex and held anterior superior medially at the infundibulum.  The cystic duct was isolated after extensive dissection and pushing a stone suck at the infundibulum and cystic duct junction up back into the gallbladder. Clip was placed to hold the stone in the gallbladder.   The cystic artery isolated, the critical view was obtained.  The cystic duct was incised cholangiogram catheter was inserted.  Cholangiogram was done, showed free flow into the duodenum and both hepatic radicles with no filling defects.  This was removed.  Duct was clipped 3 times proximally 1 time distally and cut.  Cystic artery was also clipped 2 times proximally 1 time distally and cut.  Gallbladder was then  removed from the liver bed using electrocautery, hemostasis was good at the end of the procedure there was minimal bile spillage. This was irrigated at the end of the procedure. Gallbladder was placed in an Endo Catch bag.  And brought out through the umbilical port site.  The abdomen was desufflated and irrigated.  The ports were removed under direct vision, no bleeding was noted the O Vicryl stay sutures tied in a pursestring fashion at the umbilicus the skin was closed with 4-0 Vicryl.  Steri-Strips and dressings were applied.  Patient was awakened and extubated taken to PACU in stable condition.  Complications:  None; patient tolerated the procedure well.    Disposition: PACU - hemodynamically stable.  Condition: stable         Additional Details: above    Attending Attestation: A qualified resident physician was not available.    Beverly Cardenas  Phone Number: [phone]

## 2024-03-12 LAB
LABORATORY COMMENT REPORT: NORMAL
PATH REPORT.FINAL DX SPEC: NORMAL
PATH REPORT.GROSS SPEC: NORMAL
PATH REPORT.RELEVANT HX SPEC: NORMAL
PATH REPORT.TOTAL CANCER: NORMAL

## 2024-05-08 ENCOUNTER — HOSPITAL ENCOUNTER (EMERGENCY)
Facility: HOSPITAL | Age: 49
Discharge: OTHER INSTITUTION | End: 2024-05-08
Attending: EMERGENCY MEDICINE
Payer: COMMERCIAL

## 2024-05-08 ENCOUNTER — APPOINTMENT (OUTPATIENT)
Dept: CARDIOLOGY | Facility: HOSPITAL | Age: 49
End: 2024-05-08
Payer: COMMERCIAL

## 2024-05-08 VITALS
HEIGHT: 68 IN | HEART RATE: 88 BPM | SYSTOLIC BLOOD PRESSURE: 122 MMHG | WEIGHT: 180 LBS | RESPIRATION RATE: 16 BRPM | OXYGEN SATURATION: 98 % | DIASTOLIC BLOOD PRESSURE: 72 MMHG | BODY MASS INDEX: 27.28 KG/M2 | TEMPERATURE: 98.2 F

## 2024-05-08 DIAGNOSIS — R45.851 SUICIDAL IDEATION: Primary | ICD-10-CM

## 2024-05-08 LAB
ALBUMIN SERPL BCP-MCNC: 4 G/DL (ref 3.4–5)
ALP SERPL-CCNC: 94 U/L (ref 33–110)
ALT SERPL W P-5'-P-CCNC: 23 U/L (ref 7–45)
AMPHETAMINES UR QL SCN: ABNORMAL
ANION GAP SERPL CALC-SCNC: 12 MMOL/L (ref 10–20)
APAP SERPL-MCNC: <10 UG/ML
APPEARANCE UR: ABNORMAL
AST SERPL W P-5'-P-CCNC: 23 U/L (ref 9–39)
BARBITURATES UR QL SCN: ABNORMAL
BASOPHILS # BLD AUTO: 0.03 X10*3/UL (ref 0–0.1)
BASOPHILS NFR BLD AUTO: 0.4 %
BENZODIAZ UR QL SCN: ABNORMAL
BILIRUB SERPL-MCNC: 0.4 MG/DL (ref 0–1.2)
BILIRUB UR STRIP.AUTO-MCNC: NEGATIVE MG/DL
BUN SERPL-MCNC: 12 MG/DL (ref 6–23)
BZE UR QL SCN: ABNORMAL
CALCIUM SERPL-MCNC: 9 MG/DL (ref 8.6–10.3)
CANNABINOIDS UR QL SCN: ABNORMAL
CHLORIDE SERPL-SCNC: 107 MMOL/L (ref 98–107)
CO2 SERPL-SCNC: 24 MMOL/L (ref 21–32)
COLOR UR: YELLOW
CREAT SERPL-MCNC: 1.18 MG/DL (ref 0.5–1.05)
EGFRCR SERPLBLD CKD-EPI 2021: 58 ML/MIN/1.73M*2
EOSINOPHIL # BLD AUTO: 0.13 X10*3/UL (ref 0–0.7)
EOSINOPHIL NFR BLD AUTO: 1.6 %
ERYTHROCYTE [DISTWIDTH] IN BLOOD BY AUTOMATED COUNT: 12.9 % (ref 11.5–14.5)
ETHANOL SERPL-MCNC: <10 MG/DL
FENTANYL+NORFENTANYL UR QL SCN: ABNORMAL
GLUCOSE SERPL-MCNC: 86 MG/DL (ref 74–99)
GLUCOSE UR STRIP.AUTO-MCNC: NEGATIVE MG/DL
HCG UR QL IA.RAPID: NEGATIVE
HCT VFR BLD AUTO: 40.3 % (ref 36–46)
HGB BLD-MCNC: 13.8 G/DL (ref 12–16)
HYALINE CASTS #/AREA URNS AUTO: ABNORMAL /LPF
IMM GRANULOCYTES # BLD AUTO: 0.03 X10*3/UL (ref 0–0.7)
IMM GRANULOCYTES NFR BLD AUTO: 0.4 % (ref 0–0.9)
KETONES UR STRIP.AUTO-MCNC: ABNORMAL MG/DL
LEUKOCYTE ESTERASE UR QL STRIP.AUTO: NEGATIVE
LYMPHOCYTES # BLD AUTO: 1.25 X10*3/UL (ref 1.2–4.8)
LYMPHOCYTES NFR BLD AUTO: 15.7 %
MCH RBC QN AUTO: 30.3 PG (ref 26–34)
MCHC RBC AUTO-ENTMCNC: 34.2 G/DL (ref 32–36)
MCV RBC AUTO: 88 FL (ref 80–100)
METHADONE UR QL SCN: ABNORMAL
MONOCYTES # BLD AUTO: 0.52 X10*3/UL (ref 0.1–1)
MONOCYTES NFR BLD AUTO: 6.5 %
MUCOUS THREADS #/AREA URNS AUTO: ABNORMAL /LPF
NEUTROPHILS # BLD AUTO: 5.98 X10*3/UL (ref 1.2–7.7)
NEUTROPHILS NFR BLD AUTO: 75.4 %
NITRITE UR QL STRIP.AUTO: NEGATIVE
NRBC BLD-RTO: 0 /100 WBCS (ref 0–0)
OPIATES UR QL SCN: ABNORMAL
OXYCODONE+OXYMORPHONE UR QL SCN: ABNORMAL
PCP UR QL SCN: ABNORMAL
PH UR STRIP.AUTO: 5 [PH]
PLATELET # BLD AUTO: 187 X10*3/UL (ref 150–450)
POTASSIUM SERPL-SCNC: 4.4 MMOL/L (ref 3.5–5.3)
PROT SERPL-MCNC: 6.7 G/DL (ref 6.4–8.2)
PROT UR STRIP.AUTO-MCNC: ABNORMAL MG/DL
RBC # BLD AUTO: 4.56 X10*6/UL (ref 4–5.2)
RBC # UR STRIP.AUTO: NEGATIVE /UL
RBC #/AREA URNS AUTO: ABNORMAL /HPF
SALICYLATES SERPL-MCNC: <3 MG/DL
SARS-COV-2 RNA RESP QL NAA+PROBE: NOT DETECTED
SODIUM SERPL-SCNC: 139 MMOL/L (ref 136–145)
SP GR UR STRIP.AUTO: 1.03
SQUAMOUS #/AREA URNS AUTO: ABNORMAL /HPF
UROBILINOGEN UR STRIP.AUTO-MCNC: 2 MG/DL
WBC # BLD AUTO: 7.9 X10*3/UL (ref 4.4–11.3)
WBC #/AREA URNS AUTO: ABNORMAL /HPF

## 2024-05-08 PROCEDURE — 36415 COLL VENOUS BLD VENIPUNCTURE: CPT | Performed by: NURSE PRACTITIONER

## 2024-05-08 PROCEDURE — 80320 DRUG SCREEN QUANTALCOHOLS: CPT | Performed by: NURSE PRACTITIONER

## 2024-05-08 PROCEDURE — 87635 SARS-COV-2 COVID-19 AMP PRB: CPT | Performed by: NURSE PRACTITIONER

## 2024-05-08 PROCEDURE — 81001 URINALYSIS AUTO W/SCOPE: CPT | Mod: 59 | Performed by: NURSE PRACTITIONER

## 2024-05-08 PROCEDURE — 93005 ELECTROCARDIOGRAM TRACING: CPT

## 2024-05-08 PROCEDURE — 99285 EMERGENCY DEPT VISIT HI MDM: CPT | Mod: CS

## 2024-05-08 PROCEDURE — 85025 COMPLETE CBC W/AUTO DIFF WBC: CPT | Performed by: NURSE PRACTITIONER

## 2024-05-08 PROCEDURE — 81025 URINE PREGNANCY TEST: CPT | Performed by: NURSE PRACTITIONER

## 2024-05-08 PROCEDURE — 80053 COMPREHEN METABOLIC PANEL: CPT | Performed by: NURSE PRACTITIONER

## 2024-05-08 PROCEDURE — 80307 DRUG TEST PRSMV CHEM ANLYZR: CPT | Performed by: NURSE PRACTITIONER

## 2024-05-08 SDOH — HEALTH STABILITY: MENTAL HEALTH: HAVE YOU WISHED YOU WERE DEAD OR WISHED YOU COULD GO TO SLEEP AND NOT WAKE UP?: YES

## 2024-05-08 SDOH — HEALTH STABILITY: MENTAL HEALTH: BEHAVIORS/MOOD: WITHDRAWN;COOPERATIVE

## 2024-05-08 SDOH — HEALTH STABILITY: MENTAL HEALTH: HAVE YOU ACTUALLY HAD ANY THOUGHTS OF KILLING YOURSELF?: YES

## 2024-05-08 SDOH — HEALTH STABILITY: MENTAL HEALTH: HAVE YOU EVER DONE ANYTHING, STARTED TO DO ANYTHING, OR PREPARED TO DO ANYTHING TO END YOUR LIFE?: NO

## 2024-05-08 SDOH — HEALTH STABILITY: MENTAL HEALTH: SUICIDE ASSESSMENT: ADULT (C-SSRS)

## 2024-05-08 SDOH — HEALTH STABILITY: MENTAL HEALTH: HAVE YOU BEEN THINKING ABOUT HOW YOU MIGHT DO THIS?: YES

## 2024-05-08 SDOH — HEALTH STABILITY: MENTAL HEALTH
HAVE YOU STARTED TO WORK OUT OR WORKED OUT THE DETAILS OF HOW TO KILL YOURSELF? DO YOU INTENT TO CARRY OUT THIS PLAN?: NO

## 2024-05-08 SDOH — HEALTH STABILITY: MENTAL HEALTH: HAVE YOU HAD THESE THOUGHTS AND HAD SOME INTENTION OF ACTING ON THEM?: NO

## 2024-05-08 SDOH — HEALTH STABILITY: MENTAL HEALTH

## 2024-05-08 ASSESSMENT — PAIN SCALES - GENERAL: PAINLEVEL_OUTOF10: 0 - NO PAIN

## 2024-05-08 ASSESSMENT — LIFESTYLE VARIABLES
HAVE YOU EVER FELT YOU SHOULD CUT DOWN ON YOUR DRINKING: NO
TOTAL SCORE: 0
EVER FELT BAD OR GUILTY ABOUT YOUR DRINKING: NO
EVER HAD A DRINK FIRST THING IN THE MORNING TO STEADY YOUR NERVES TO GET RID OF A HANGOVER: NO
HAVE PEOPLE ANNOYED YOU BY CRITICIZING YOUR DRINKING: NO

## 2024-05-08 ASSESSMENT — COLUMBIA-SUICIDE SEVERITY RATING SCALE - C-SSRS
4. HAVE YOU HAD THESE THOUGHTS AND HAD SOME INTENTION OF ACTING ON THEM?: NO
6. HAVE YOU EVER DONE ANYTHING, STARTED TO DO ANYTHING, OR PREPARED TO DO ANYTHING TO END YOUR LIFE?: YES
2. HAVE YOU ACTUALLY HAD ANY THOUGHTS OF KILLING YOURSELF?: YES
1. IN THE PAST MONTH, HAVE YOU WISHED YOU WERE DEAD OR WISHED YOU COULD GO TO SLEEP AND NOT WAKE UP?: YES
1. IN THE PAST MONTH, HAVE YOU WISHED YOU WERE DEAD OR WISHED YOU COULD GO TO SLEEP AND NOT WAKE UP?: YES
6. HAVE YOU EVER DONE ANYTHING, STARTED TO DO ANYTHING, OR PREPARED TO DO ANYTHING TO END YOUR LIFE?: YES
5. HAVE YOU STARTED TO WORK OUT OR WORKED OUT THE DETAILS OF HOW TO KILL YOURSELF? DO YOU INTEND TO CARRY OUT THIS PLAN?: NO
4. HAVE YOU HAD THESE THOUGHTS AND HAD SOME INTENTION OF ACTING ON THEM?: NO
6. HAVE YOU EVER DONE ANYTHING, STARTED TO DO ANYTHING, OR PREPARED TO DO ANYTHING TO END YOUR LIFE?: NO
2. HAVE YOU ACTUALLY HAD ANY THOUGHTS OF KILLING YOURSELF?: YES
6. HAVE YOU EVER DONE ANYTHING, STARTED TO DO ANYTHING, OR PREPARED TO DO ANYTHING TO END YOUR LIFE?: YES
5. HAVE YOU STARTED TO WORK OUT OR WORKED OUT THE DETAILS OF HOW TO KILL YOURSELF? DO YOU INTEND TO CARRY OUT THIS PLAN?: NO

## 2024-05-08 ASSESSMENT — PAIN - FUNCTIONAL ASSESSMENT: PAIN_FUNCTIONAL_ASSESSMENT: 0-10

## 2024-05-08 NOTE — ED PROVIDER NOTES
HPI   Chief Complaint   Patient presents with    Suicidal     Pt recently evicted elicits SI to PD and EMS on scene PD bria        This is a 48-year-old  female, with a past medical history of depression, fibromyalgia, hepatitis C, rheumatoid arthritis, seizures, that is presenting to the emergency room with suicidal ideation.  The patient reported that she wanted to harm herself this morning.  She attempted to cut her wrist but states that the knife was not sharp enough.  The patient is in the process of moving.  She went over to family's house and she reports that they took her keys from her and locked her out of the house.  The patient tried to break into the house by kicking the door.  She made statements that she wanted to harm herself.  Police were called and the patient was brought to the emergency room for evaluation.  The patient is currently on Effexor, gabapentin, and Abilify.  She has been taking her medications as directed.  The patient has been hospitalized in the past for similar complaints.  She denies any alcohol or street drug use.  She denies any medical complaints.      History provided by:  Patient   used: No                        [place] Coma Scale Score: 15                     Patient History   Past Medical History:   Diagnosis Date    Anxiety disorder, unspecified     Anxiety    Constipation     Dysphagia     Encounter for gynecological examination (general) (routine) without abnormal findings     Encounter for gynecological examination without abnormal finding    Encounter for screening mammogram for malignant neoplasm of breast     Visit for screening mammogram    Gastric ulcer     Hyperlipidemia     Infectious viral hepatitis     MRSA (methicillin resistant Staphylococcus aureus)     Other chronic pain     Chronic pain    Other conditions influencing health status     Traumatic Fracture    Personal history of diseases of the blood and blood-forming organs  and certain disorders involving the immune mechanism     History of anemia    Personal history of other diseases of the digestive system 2021    History of duodenal ulcer    Personal history of other diseases of the musculoskeletal system and connective tissue     History of fibromyalgia    Personal history of other diseases of the musculoskeletal system and connective tissue     History of degenerative disc disease    Personal history of other diseases of the musculoskeletal system and connective tissue     History of rheumatoid arthritis    Personal history of other diseases of the respiratory system     Personal history of asthma    Personal history of other endocrine, nutritional and metabolic disease     History of hyperlipidemia    Personal history of other mental and behavioral disorders     History of depression    Rectal bleeding     SOB (shortness of breath)      Past Surgical History:   Procedure Laterality Date     SECTION, CLASSIC  2017     Section    CHOLECYSTECTOMY      COLONOSCOPY  2013    Complete Colonoscopy    ELBOW SURGERY  2013    Elbow Surgery    HYSTERECTOMY  2013    Hysterectomy    OTHER SURGICAL HISTORY  2022    Oral surgery    OTHER SURGICAL HISTORY  2022    Shoulder surgery     Family History   Problem Relation Name Age of Onset    Depression Mother      Atrial fibrillation Father      Cancer Father      Depression Brother      Other (cardiac disorder) Maternal Grandmother      Colon cancer Paternal Grandmother      Diabetes Other uncle      Social History     Tobacco Use    Smoking status: Every Day     Current packs/day: 0.50     Average packs/day: 0.5 packs/day for 26.0 years (13.0 ttl pk-yrs)     Types: Cigarettes    Smokeless tobacco: Never   Substance Use Topics    Alcohol use: Not Currently    Drug use: Yes     Types: Marijuana     Comment: OCCASIONAL       Physical Exam   ED Triage Vitals [24 1355]   Temperature Heart  Rate Respirations BP   36.8 °C (98.2 °F) (!) 105 20 107/74      Pulse Ox Temp src Heart Rate Source Patient Position   97 % -- -- --      BP Location FiO2 (%)     -- --       Physical Exam  Vitals and nursing note reviewed.   HENT:      Right Ear: External ear normal.      Left Ear: External ear normal.      Nose: Nose normal.      Mouth/Throat:      Pharynx: Oropharynx is clear.   Eyes:      Conjunctiva/sclera: Conjunctivae normal.   Cardiovascular:      Rate and Rhythm: Normal rate and regular rhythm.      Pulses: Normal pulses.      Heart sounds: Normal heart sounds.   Pulmonary:      Effort: Pulmonary effort is normal.      Breath sounds: Normal breath sounds.   Abdominal:      General: Bowel sounds are normal.      Palpations: Abdomen is soft.   Musculoskeletal:         General: Normal range of motion.      Cervical back: Normal range of motion.   Skin:     General: Skin is warm.      Capillary Refill: Capillary refill takes less than 2 seconds.   Neurological:      General: No focal deficit present.      Mental Status: She is alert.   Psychiatric:         Attention and Perception: Attention normal.         Mood and Affect: Mood is depressed. Affect is tearful.         Speech: Speech normal.         Behavior: Behavior is cooperative.         Thought Content: Thought content is not paranoid or delusional. Thought content includes suicidal ideation. Thought content does not include homicidal ideation. Thought content includes suicidal plan. Thought content does not include homicidal plan.         Cognition and Memory: Cognition normal.         Judgment: Judgment is impulsive.         ED Course & MDM   ED Course as of 05/27/24 0706   Wed May 08, 2024   1951 This patient was seen by the advanced practice provider.  I have personally performed a substantive portion of the encounter.  I have seen and examined the patient; agree with the workup, evaluation, MDM, management and diagnosis.  The care plan has been  discussed.      I personally saw the patient and made/approved the management plan and take responsibility for the patient management.    History: Briefly patient here for suicidal ideation  Exam: Vital stable  MDM: I independently interpreted study(s) showing CBC shows no leukocytosis.  Metabolic panel shows elevated creatinine 1.18, normal liver function.  Patient is a pregnant by urine.  Urine drug screens positive for amphetamines, marijuana, cocaine and fentanyl.  Serum tox screen is negative.  COVID not detected.  Patient medically for psychiatric evaluation treatment.  Patient was evaluated by [facility] who is recommending placement.  Patient will be placed at [facility].   [JH]   2026 Electrocardiogram, 12-lead  Twelve-lead EKG interpreted by me.  Sinus tachycardia at a rate of 101.  WV interval is 137, QRS is 76, QT is 338, QTc is 439.  Normal axis.  Borderline T wave abnormalities in anterior leads.  No acute ischemia or injury pattern noted. [CT]      ED Course User Index  [CT] Annelise Flores, APRN-CNP  [JH] Art Brink MD         Diagnoses as of 05/27/24 0706   Suicidal ideation       Medical Decision Making  Patient was seen and evaluated by with the attending physician, Dr. Brink.  The patient was placed on one-to-one observation.  Laboratory studies were drawn with results as noted.  CBC showed no acute leukocytosis.  CMP showed a creatinine of 1.18.  hCG was negative.  Acute toxicology panel was negative.  COVID testing was negative.  Urine drug screen was positive for cannabinoids, cocaine, fentanyl, and amphetamines.  Her routine urinalysis did not show any signs of urinary tract infection.  The patient does not have any acute medical conditions that would prevent psychiatric evaluation or hospitalization.  The patient was evaluated by Re[facility] Childress and they are recommending inpatient placement.  The patient is to be placed at the [facility].  Patient is to be transferred via private  ambulance in stable condition.        Procedure  Procedures     CL Mccoy-MARGUERITE  05/08/24 2030       CL Mccoy-MARGUERITE  05/27/24 0706

## 2024-05-09 ENCOUNTER — HOSPITAL ENCOUNTER (EMERGENCY)
Facility: HOSPITAL | Age: 49
Discharge: HOME | End: 2024-05-09
Attending: STUDENT IN AN ORGANIZED HEALTH CARE EDUCATION/TRAINING PROGRAM
Payer: COMMERCIAL

## 2024-05-09 VITALS
RESPIRATION RATE: 14 BRPM | HEART RATE: 70 BPM | TEMPERATURE: 97.8 F | BODY MASS INDEX: 27.06 KG/M2 | OXYGEN SATURATION: 97 % | DIASTOLIC BLOOD PRESSURE: 50 MMHG | SYSTOLIC BLOOD PRESSURE: 101 MMHG | WEIGHT: 178.57 LBS | HEIGHT: 68 IN

## 2024-05-09 DIAGNOSIS — L03.113 CELLULITIS OF RIGHT FOREARM: Primary | ICD-10-CM

## 2024-05-09 LAB
ALBUMIN SERPL-MCNC: 3.9 G/DL (ref 3.5–5)
ALP BLD-CCNC: 124 U/L (ref 35–125)
ALT SERPL-CCNC: 21 U/L (ref 5–40)
ANION GAP SERPL CALC-SCNC: 10 MMOL/L
AST SERPL-CCNC: 20 U/L (ref 5–40)
BASOPHILS # BLD AUTO: 0.05 X10*3/UL (ref 0–0.1)
BASOPHILS NFR BLD AUTO: 0.8 %
BILIRUB SERPL-MCNC: 0.3 MG/DL (ref 0.1–1.2)
BUN SERPL-MCNC: 15 MG/DL (ref 8–25)
CALCIUM SERPL-MCNC: 8.9 MG/DL (ref 8.5–10.4)
CHLORIDE SERPL-SCNC: 103 MMOL/L (ref 97–107)
CO2 SERPL-SCNC: 26 MMOL/L (ref 24–31)
CREAT SERPL-MCNC: 1.1 MG/DL (ref 0.4–1.6)
EGFRCR SERPLBLD CKD-EPI 2021: 63 ML/MIN/1.73M*2
EOSINOPHIL # BLD AUTO: 0.31 X10*3/UL (ref 0–0.7)
EOSINOPHIL NFR BLD AUTO: 4.7 %
ERYTHROCYTE [DISTWIDTH] IN BLOOD BY AUTOMATED COUNT: 13 % (ref 11.5–14.5)
GLUCOSE SERPL-MCNC: 117 MG/DL (ref 65–99)
HCT VFR BLD AUTO: 39.4 % (ref 36–46)
HGB BLD-MCNC: 13 G/DL (ref 12–16)
IMM GRANULOCYTES # BLD AUTO: 0.02 X10*3/UL (ref 0–0.7)
IMM GRANULOCYTES NFR BLD AUTO: 0.3 % (ref 0–0.9)
LYMPHOCYTES # BLD AUTO: 2.29 X10*3/UL (ref 1.2–4.8)
LYMPHOCYTES NFR BLD AUTO: 34.9 %
MCH RBC QN AUTO: 29.5 PG (ref 26–34)
MCHC RBC AUTO-ENTMCNC: 33 G/DL (ref 32–36)
MCV RBC AUTO: 90 FL (ref 80–100)
MONOCYTES # BLD AUTO: 0.55 X10*3/UL (ref 0.1–1)
MONOCYTES NFR BLD AUTO: 8.4 %
NEUTROPHILS # BLD AUTO: 3.34 X10*3/UL (ref 1.2–7.7)
NEUTROPHILS NFR BLD AUTO: 50.9 %
NRBC BLD-RTO: 0 /100 WBCS (ref 0–0)
PLATELET # BLD AUTO: 187 X10*3/UL (ref 150–450)
POTASSIUM SERPL-SCNC: 3.6 MMOL/L (ref 3.4–5.1)
PROT SERPL-MCNC: 6.5 G/DL (ref 5.9–7.9)
RBC # BLD AUTO: 4.4 X10*6/UL (ref 4–5.2)
SODIUM SERPL-SCNC: 139 MMOL/L (ref 133–145)
WBC # BLD AUTO: 6.6 X10*3/UL (ref 4.4–11.3)

## 2024-05-09 PROCEDURE — 96375 TX/PRO/DX INJ NEW DRUG ADDON: CPT

## 2024-05-09 PROCEDURE — 80053 COMPREHEN METABOLIC PANEL: CPT | Performed by: STUDENT IN AN ORGANIZED HEALTH CARE EDUCATION/TRAINING PROGRAM

## 2024-05-09 PROCEDURE — 96365 THER/PROPH/DIAG IV INF INIT: CPT

## 2024-05-09 PROCEDURE — 36415 COLL VENOUS BLD VENIPUNCTURE: CPT | Performed by: STUDENT IN AN ORGANIZED HEALTH CARE EDUCATION/TRAINING PROGRAM

## 2024-05-09 PROCEDURE — 99284 EMERGENCY DEPT VISIT MOD MDM: CPT | Mod: 25

## 2024-05-09 PROCEDURE — 85025 COMPLETE CBC W/AUTO DIFF WBC: CPT | Performed by: STUDENT IN AN ORGANIZED HEALTH CARE EDUCATION/TRAINING PROGRAM

## 2024-05-09 PROCEDURE — 2500000004 HC RX 250 GENERAL PHARMACY W/ HCPCS (ALT 636 FOR OP/ED): Performed by: STUDENT IN AN ORGANIZED HEALTH CARE EDUCATION/TRAINING PROGRAM

## 2024-05-09 RX ORDER — CLINDAMYCIN PHOSPHATE 600 MG/50ML
600 INJECTION, SOLUTION INTRAVENOUS ONCE
Status: COMPLETED | OUTPATIENT
Start: 2024-05-09 | End: 2024-05-09

## 2024-05-09 RX ORDER — CLINDAMYCIN HYDROCHLORIDE 150 MG/1
450 CAPSULE ORAL 3 TIMES DAILY
Qty: 63 CAPSULE | Refills: 0 | Status: SHIPPED | OUTPATIENT
Start: 2024-05-09 | End: 2024-05-16

## 2024-05-09 RX ORDER — CLINDAMYCIN HYDROCHLORIDE 150 MG/1
450 CAPSULE ORAL 3 TIMES DAILY
Qty: 63 CAPSULE | Refills: 0 | Status: SHIPPED | OUTPATIENT
Start: 2024-05-09 | End: 2024-05-09

## 2024-05-09 RX ORDER — FENTANYL CITRATE 50 UG/ML
50 INJECTION, SOLUTION INTRAMUSCULAR; INTRAVENOUS ONCE
Status: COMPLETED | OUTPATIENT
Start: 2024-05-09 | End: 2024-05-09

## 2024-05-09 RX ADMIN — FENTANYL CITRATE 50 MCG: 50 INJECTION INTRAMUSCULAR; INTRAVENOUS at 03:09

## 2024-05-09 RX ADMIN — SODIUM CHLORIDE 1000 ML: 900 INJECTION, SOLUTION INTRAVENOUS at 03:09

## 2024-05-09 RX ADMIN — CLINDAMYCIN PHOSPHATE 600 MG: 600 INJECTION, SOLUTION INTRAVENOUS at 03:09

## 2024-05-09 RX ADMIN — SODIUM CHLORIDE 1000 ML: 900 INJECTION, SOLUTION INTRAVENOUS at 04:22

## 2024-05-09 ASSESSMENT — COLUMBIA-SUICIDE SEVERITY RATING SCALE - C-SSRS
1. IN THE PAST MONTH, HAVE YOU WISHED YOU WERE DEAD OR WISHED YOU COULD GO TO SLEEP AND NOT WAKE UP?: YES
6. HAVE YOU EVER DONE ANYTHING, STARTED TO DO ANYTHING, OR PREPARED TO DO ANYTHING TO END YOUR LIFE?: YES
4. HAVE YOU HAD THESE THOUGHTS AND HAD SOME INTENTION OF ACTING ON THEM?: NO
2. HAVE YOU ACTUALLY HAD ANY THOUGHTS OF KILLING YOURSELF?: YES
6. HAVE YOU EVER DONE ANYTHING, STARTED TO DO ANYTHING, OR PREPARED TO DO ANYTHING TO END YOUR LIFE?: NO
5. HAVE YOU STARTED TO WORK OUT OR WORKED OUT THE DETAILS OF HOW TO KILL YOURSELF? DO YOU INTEND TO CARRY OUT THIS PLAN?: NO

## 2024-05-09 NOTE — DISCHARGE INSTRUCTIONS
Take antibiotics to to completion for the next week.  If symptoms worsen or change she can return at any time for further evaluation and treatment.

## 2024-05-09 NOTE — ED TRIAGE NOTES
Pt arrives from [facility] after bring medically cleared here for c/o insect bite to right arm. 3 inches redness, warm, and raised. Aox4

## 2024-05-09 NOTE — ED PROVIDER NOTES
HPI   Chief Complaint   Patient presents with    Insect Bite       Patient is a 48-year-old female that presents emergency department for evaluation of cellulitis and insect bite to the right forearm.  Patient states that it started yesterday earlier in the day.  She believes she may have been bit or stung by an insect on her right forearm.  She has since had swelling and redness over the mid forearm and the redness is now tracked up to the medial aspect of the right forearm to the elbow.  She states that there is pain with movement of the hand and wrist however denies numbness or tingling extremity, weakness of the extremity.  Patient does have a history of polysubstance abuse however denies any IV drug abuse.  Patient currently at [facility] for suicidal ideation on involuntary hold and was sent over here for medical screening due to the concern for cellulitis.      History provided by:  Patient                      [place] Coma Scale Score: 15                     Patient History   Past Medical History:   Diagnosis Date    Anxiety disorder, unspecified     Anxiety    Constipation     Dysphagia     Encounter for gynecological examination (general) (routine) without abnormal findings     Encounter for gynecological examination without abnormal finding    Encounter for screening mammogram for malignant neoplasm of breast     Visit for screening mammogram    Gastric ulcer     Hyperlipidemia     Infectious viral hepatitis     MRSA (methicillin resistant Staphylococcus aureus)     Other chronic pain     Chronic pain    Other conditions influencing health status     Traumatic Fracture    Personal history of diseases of the blood and blood-forming organs and certain disorders involving the immune mechanism     History of anemia    Personal history of other diseases of the digestive system 12/29/2021    History of duodenal ulcer    Personal history of other diseases of the musculoskeletal system and connective  tissue     History of fibromyalgia    Personal history of other diseases of the musculoskeletal system and connective tissue     History of degenerative disc disease    Personal history of other diseases of the musculoskeletal system and connective tissue     History of rheumatoid arthritis    Personal history of other diseases of the respiratory system     Personal history of asthma    Personal history of other endocrine, nutritional and metabolic disease     History of hyperlipidemia    Personal history of other mental and behavioral disorders     History of depression    Rectal bleeding     SOB (shortness of breath)      Past Surgical History:   Procedure Laterality Date     SECTION, CLASSIC  2017     Section    CHOLECYSTECTOMY      COLONOSCOPY  2013    Complete Colonoscopy    ELBOW SURGERY  2013    Elbow Surgery    HYSTERECTOMY  2013    Hysterectomy    OTHER SURGICAL HISTORY  2022    Oral surgery    OTHER SURGICAL HISTORY  2022    Shoulder surgery     Family History   Problem Relation Name Age of Onset    Depression Mother      Atrial fibrillation Father      Cancer Father      Depression Brother      Other (cardiac disorder) Maternal Grandmother      Colon cancer Paternal Grandmother      Diabetes Other uncle      Social History     Tobacco Use    Smoking status: Every Day     Current packs/day: 0.50     Average packs/day: 0.5 packs/day for 26.0 years (13.0 ttl pk-yrs)     Types: Cigarettes    Smokeless tobacco: Never   Substance Use Topics    Alcohol use: Not Currently    Drug use: Yes     Types: Marijuana     Comment: OCCASIONAL       Physical Exam   ED Triage Vitals [24 0231]   Temperature Heart Rate Respirations BP   36.6 °C (97.8 °F) 70 14 (!) 101/35      Pulse Ox Temp Source Heart Rate Source Patient Position   (!) 93 % Oral -- --      BP Location FiO2 (%)     -- --       Physical Exam  Vitals and nursing note reviewed.   Constitutional:        General: She is not in acute distress.     Appearance: Normal appearance. She is not ill-appearing.   HENT:      Head: Normocephalic and atraumatic.      Mouth/Throat:      Mouth: Mucous membranes are moist.   Eyes:      Extraocular Movements: Extraocular movements intact.      Pupils: Pupils are equal, round, and reactive to light.   Cardiovascular:      Rate and Rhythm: Normal rate and regular rhythm.      Pulses: Normal pulses.   Pulmonary:      Effort: Pulmonary effort is normal. No respiratory distress.      Breath sounds: No stridor. No wheezing or rhonchi.   Abdominal:      General: Abdomen is flat.      Tenderness: There is no abdominal tenderness. There is no guarding or rebound.   Musculoskeletal:         General: Tenderness (Tenderness palpation over the induration area over the medial forearm.) present. No swelling.   Skin:     General: Skin is warm and dry.      Capillary Refill: Capillary refill takes less than 2 seconds.      Findings: Erythema (There is an area of induration over the mid forearm measuring roughly 3 cm in circumference with some surrounding erythema that does track along the medial aspect of the forearm to the elbow.  No lymphangitic streaking noted) present.   Neurological:      General: No focal deficit present.      Mental Status: She is alert and oriented to person, place, and time.      Sensory: No sensory deficit.       Recent Results (from the past 24 hour(s))   Sars-CoV-2 PCR    Collection Time: 05/08/24  2:34 PM   Result Value Ref Range    Coronavirus 2019, PCR Not Detected Not Detected   CBC and Auto Differential    Collection Time: 05/08/24  2:37 PM   Result Value Ref Range    WBC 7.9 4.4 - 11.3 x10*3/uL    nRBC 0.0 0.0 - 0.0 /100 WBCs    RBC 4.56 4.00 - 5.20 x10*6/uL    Hemoglobin 13.8 12.0 - 16.0 g/dL    Hematocrit 40.3 36.0 - 46.0 %    MCV 88 80 - 100 fL    MCH 30.3 26.0 - 34.0 pg    MCHC 34.2 32.0 - 36.0 g/dL    RDW 12.9 11.5 - 14.5 %    Platelets 187 150 - 450 x10*3/uL     Neutrophils % 75.4 40.0 - 80.0 %    Immature Granulocytes %, Automated 0.4 0.0 - 0.9 %    Lymphocytes % 15.7 13.0 - 44.0 %    Monocytes % 6.5 2.0 - 10.0 %    Eosinophils % 1.6 0.0 - 6.0 %    Basophils % 0.4 0.0 - 2.0 %    Neutrophils Absolute 5.98 1.20 - 7.70 x10*3/uL    Immature Granulocytes Absolute, Automated 0.03 0.00 - 0.70 x10*3/uL    Lymphocytes Absolute 1.25 1.20 - 4.80 x10*3/uL    Monocytes Absolute 0.52 0.10 - 1.00 x10*3/uL    Eosinophils Absolute 0.13 0.00 - 0.70 x10*3/uL    Basophils Absolute 0.03 0.00 - 0.10 x10*3/uL   Comprehensive Metabolic Panel    Collection Time: 05/08/24  2:37 PM   Result Value Ref Range    Glucose 86 74 - 99 mg/dL    Sodium 139 136 - 145 mmol/L    Potassium 4.4 3.5 - 5.3 mmol/L    Chloride 107 98 - 107 mmol/L    Bicarbonate 24 21 - 32 mmol/L    Anion Gap 12 10 - 20 mmol/L    Urea Nitrogen 12 6 - 23 mg/dL    Creatinine 1.18 (H) 0.50 - 1.05 mg/dL    eGFR 58 (L) >60 mL/min/1.73m*2    Calcium 9.0 8.6 - 10.3 mg/dL    Albumin 4.0 3.4 - 5.0 g/dL    Alkaline Phosphatase 94 33 - 110 U/L    Total Protein 6.7 6.4 - 8.2 g/dL    AST 23 9 - 39 U/L    Bilirubin, Total 0.4 0.0 - 1.2 mg/dL    ALT 23 7 - 45 U/L   Acute Toxicology Panel, Blood    Collection Time: 05/08/24  2:37 PM   Result Value Ref Range    Acetaminophen <10.0 10.0 - 30.0 ug/mL    Salicylate  <3 4 - 20 mg/dL    Alcohol <10 <=10 mg/dL   Drug Screen, Urine    Collection Time: 05/08/24  7:34 PM   Result Value Ref Range    Amphetamine Screen, Urine Presumptive Positive (A) Presumptive Negative    Barbiturate Screen, Urine Presumptive Negative Presumptive Negative    Benzodiazepines Screen, Urine Presumptive Negative Presumptive Negative    Cannabinoid Screen, Urine Presumptive Positive (A) Presumptive Negative    Cocaine Metabolite Screen, Urine Presumptive Positive (A) Presumptive Negative    Fentanyl Screen, Urine Presumptive Positive (A) Presumptive Negative    Opiate Screen, Urine Presumptive Negative Presumptive Negative     Oxycodone Screen, Urine Presumptive Negative Presumptive Negative    PCP Screen, Urine Presumptive Negative Presumptive Negative    Methadone Screen, Urine Presumptive Negative Presumptive Negative   hCG, Urine, Qualitative    Collection Time: 05/08/24  7:34 PM   Result Value Ref Range    HCG, Urine NEGATIVE NEGATIVE   Urinalysis with Reflex Microscopic    Collection Time: 05/08/24  7:34 PM   Result Value Ref Range    Color, Urine Yellow Straw, Yellow    Appearance, Urine Hazy (N) Clear    Specific Gravity, Urine 1.026 1.005 - 1.035    pH, Urine 5.0 5.0, 5.5, 6.0, 6.5, 7.0, 7.5, 8.0    Protein, Urine 30 (1+) (N) NEGATIVE mg/dL    Glucose, Urine NEGATIVE NEGATIVE mg/dL    Blood, Urine NEGATIVE NEGATIVE    Ketones, Urine 80 (2+) (A) NEGATIVE mg/dL    Bilirubin, Urine NEGATIVE NEGATIVE    Urobilinogen, Urine 2.0 (N) <2.0 mg/dL    Nitrite, Urine NEGATIVE NEGATIVE    Leukocyte Esterase, Urine NEGATIVE NEGATIVE   Microscopic Only, Urine    Collection Time: 05/08/24  7:34 PM   Result Value Ref Range    WBC, Urine 1-5 1-5, NONE /HPF    RBC, Urine NONE NONE, 1-2, 3-5 /HPF    Squamous Epithelial Cells, Urine 10-25 (FEW) Reference range not established. /HPF    Mucus, Urine 2+ Reference range not established. /LPF    Hyaline Casts, Urine OCCASIONAL (A) NONE /LPF   CBC and Auto Differential    Collection Time: 05/09/24  3:04 AM   Result Value Ref Range    WBC 6.6 4.4 - 11.3 x10*3/uL    nRBC 0.0 0.0 - 0.0 /100 WBCs    RBC 4.40 4.00 - 5.20 x10*6/uL    Hemoglobin 13.0 12.0 - 16.0 g/dL    Hematocrit 39.4 36.0 - 46.0 %    MCV 90 80 - 100 fL    MCH 29.5 26.0 - 34.0 pg    MCHC 33.0 32.0 - 36.0 g/dL    RDW 13.0 11.5 - 14.5 %    Platelets 187 150 - 450 x10*3/uL    Neutrophils % 50.9 40.0 - 80.0 %    Immature Granulocytes %, Automated 0.3 0.0 - 0.9 %    Lymphocytes % 34.9 13.0 - 44.0 %    Monocytes % 8.4 2.0 - 10.0 %    Eosinophils % 4.7 0.0 - 6.0 %    Basophils % 0.8 0.0 - 2.0 %    Neutrophils Absolute 3.34 1.20 - 7.70 x10*3/uL     Immature Granulocytes Absolute, Automated 0.02 0.00 - 0.70 x10*3/uL    Lymphocytes Absolute 2.29 1.20 - 4.80 x10*3/uL    Monocytes Absolute 0.55 0.10 - 1.00 x10*3/uL    Eosinophils Absolute 0.31 0.00 - 0.70 x10*3/uL    Basophils Absolute 0.05 0.00 - 0.10 x10*3/uL   Comprehensive metabolic panel    Collection Time: 05/09/24  3:04 AM   Result Value Ref Range    Glucose 117 (H) 65 - 99 mg/dL    Sodium 139 133 - 145 mmol/L    Potassium 3.6 3.4 - 5.1 mmol/L    Chloride 103 97 - 107 mmol/L    Bicarbonate 26 24 - 31 mmol/L    Urea Nitrogen 15 8 - 25 mg/dL    Creatinine 1.10 0.40 - 1.60 mg/dL    eGFR 63 >60 mL/min/1.73m*2    Calcium 8.9 8.5 - 10.4 mg/dL    Albumin 3.9 3.5 - 5.0 g/dL    Alkaline Phosphatase 124 35 - 125 U/L    Total Protein 6.5 5.9 - 7.9 g/dL    AST 20 5 - 40 U/L    Bilirubin, Total 0.3 0.1 - 1.2 mg/dL    ALT 21 5 - 40 U/L    Anion Gap 10 <=19 mmol/L         ED Course & MDM   Diagnoses as of 05/09/24 0344   Cellulitis of right forearm       Medical Decision Making  Patient is a 48-year-old female that presents emergency room for evaluation of possible cellulitis and insect bite.  Patient does have significant area of induration and erythema over the medial right forearm.  It is warm to the touch however there is no bleeding or drainage noted.  There is some redness tracking along the medial aspect of the forearm up to the elbow however no lymphangitic streaking noted.  Patient afebrile on arrival.  History exam consistent with a cellulitis.  There is no fluctuance on palpation and on bedside ultrasound shows cobblestoning of the soft tissue consistent with cellulitis with no drainable abscess noted.  Patient given IV dose of clindamycin at this time.  Blood work was remarkable for normal white count of 6.6 with no left shift present, normal electrolytes, normal kidney function.  She was given IV saline bolus.  Patient has remained hemodynamically stable and is felt to be stable for discharge back to [facility] on p.o. clindamycin at this time.          Procedure  Procedures     Tony Denise,   05/09/24 0344

## 2024-05-17 LAB
ATRIAL RATE: 101 BPM
P AXIS: 44 DEGREES
PR INTERVAL: 137 MS
Q ONSET: 253 MS
QRS COUNT: 17 BEATS
QRS DURATION: 76 MS
QT INTERVAL: 338 MS
QTC CALCULATION(BAZETT): 439 MS
QTC FREDERICIA: 402 MS
R AXIS: 4 DEGREES
T AXIS: 45 DEGREES
T OFFSET: 422 MS
VENTRICULAR RATE: 101 BPM

## 2024-07-21 ENCOUNTER — HOSPITAL ENCOUNTER (EMERGENCY)
Facility: HOSPITAL | Age: 49
Discharge: HOME | End: 2024-07-21
Attending: STUDENT IN AN ORGANIZED HEALTH CARE EDUCATION/TRAINING PROGRAM
Payer: COMMERCIAL

## 2024-07-21 ENCOUNTER — APPOINTMENT (OUTPATIENT)
Dept: RADIOLOGY | Facility: HOSPITAL | Age: 49
End: 2024-07-21
Payer: COMMERCIAL

## 2024-07-21 VITALS
HEIGHT: 68 IN | HEART RATE: 88 BPM | OXYGEN SATURATION: 100 % | RESPIRATION RATE: 18 BRPM | SYSTOLIC BLOOD PRESSURE: 105 MMHG | BODY MASS INDEX: 28.04 KG/M2 | DIASTOLIC BLOOD PRESSURE: 65 MMHG | TEMPERATURE: 97.7 F | WEIGHT: 185 LBS

## 2024-07-21 DIAGNOSIS — N12 PYELONEPHRITIS: Primary | ICD-10-CM

## 2024-07-21 LAB
ALBUMIN SERPL BCP-MCNC: 4 G/DL (ref 3.4–5)
ALP SERPL-CCNC: 118 U/L (ref 33–110)
ALT SERPL W P-5'-P-CCNC: 47 U/L (ref 7–45)
ANION GAP SERPL CALC-SCNC: 12 MMOL/L (ref 10–20)
APPEARANCE UR: ABNORMAL
AST SERPL W P-5'-P-CCNC: 24 U/L (ref 9–39)
BACTERIA #/AREA URNS AUTO: ABNORMAL /HPF
BASOPHILS # BLD AUTO: 0.03 X10*3/UL (ref 0–0.1)
BASOPHILS NFR BLD AUTO: 0.3 %
BILIRUB SERPL-MCNC: 0.5 MG/DL (ref 0–1.2)
BILIRUB UR STRIP.AUTO-MCNC: NEGATIVE MG/DL
BUN SERPL-MCNC: 17 MG/DL (ref 6–23)
CALCIUM SERPL-MCNC: 9.1 MG/DL (ref 8.6–10.3)
CHLORIDE SERPL-SCNC: 98 MMOL/L (ref 98–107)
CO2 SERPL-SCNC: 27 MMOL/L (ref 21–32)
COLOR UR: YELLOW
CREAT SERPL-MCNC: 1.1 MG/DL (ref 0.5–1.05)
EGFRCR SERPLBLD CKD-EPI 2021: 63 ML/MIN/1.73M*2
EOSINOPHIL # BLD AUTO: 0.05 X10*3/UL (ref 0–0.7)
EOSINOPHIL NFR BLD AUTO: 0.5 %
ERYTHROCYTE [DISTWIDTH] IN BLOOD BY AUTOMATED COUNT: 13.2 % (ref 11.5–14.5)
GLUCOSE SERPL-MCNC: 81 MG/DL (ref 74–99)
GLUCOSE UR STRIP.AUTO-MCNC: NEGATIVE MG/DL
HCT VFR BLD AUTO: 41.5 % (ref 36–46)
HGB BLD-MCNC: 14 G/DL (ref 12–16)
IMM GRANULOCYTES # BLD AUTO: 0.03 X10*3/UL (ref 0–0.7)
IMM GRANULOCYTES NFR BLD AUTO: 0.3 % (ref 0–0.9)
KETONES UR STRIP.AUTO-MCNC: NEGATIVE MG/DL
LEUKOCYTE ESTERASE UR QL STRIP.AUTO: ABNORMAL
LIPASE SERPL-CCNC: 8 U/L (ref 9–82)
LYMPHOCYTES # BLD AUTO: 1.25 X10*3/UL (ref 1.2–4.8)
LYMPHOCYTES NFR BLD AUTO: 13.4 %
MCH RBC QN AUTO: 29.3 PG (ref 26–34)
MCHC RBC AUTO-ENTMCNC: 33.7 G/DL (ref 32–36)
MCV RBC AUTO: 87 FL (ref 80–100)
MONOCYTES # BLD AUTO: 1.51 X10*3/UL (ref 0.1–1)
MONOCYTES NFR BLD AUTO: 16.2 %
MUCOUS THREADS #/AREA URNS AUTO: ABNORMAL /LPF
NEUTROPHILS # BLD AUTO: 6.47 X10*3/UL (ref 1.2–7.7)
NEUTROPHILS NFR BLD AUTO: 69.3 %
NITRITE UR QL STRIP.AUTO: ABNORMAL
NRBC BLD-RTO: 0 /100 WBCS (ref 0–0)
PH UR STRIP.AUTO: 6 [PH]
PLATELET # BLD AUTO: 192 X10*3/UL (ref 150–450)
POTASSIUM SERPL-SCNC: 3.4 MMOL/L (ref 3.5–5.3)
PROT SERPL-MCNC: 7.3 G/DL (ref 6.4–8.2)
PROT UR STRIP.AUTO-MCNC: ABNORMAL MG/DL
RBC # BLD AUTO: 4.78 X10*6/UL (ref 4–5.2)
RBC # UR STRIP.AUTO: ABNORMAL /UL
RBC #/AREA URNS AUTO: >20 /HPF
SODIUM SERPL-SCNC: 134 MMOL/L (ref 136–145)
SP GR UR STRIP.AUTO: <1.005
SQUAMOUS #/AREA URNS AUTO: ABNORMAL /HPF
UROBILINOGEN UR STRIP.AUTO-MCNC: ABNORMAL MG/DL
WBC # BLD AUTO: 9.3 X10*3/UL (ref 4.4–11.3)
WBC #/AREA URNS AUTO: >50 /HPF

## 2024-07-21 PROCEDURE — 2550000001 HC RX 255 CONTRASTS: Performed by: STUDENT IN AN ORGANIZED HEALTH CARE EDUCATION/TRAINING PROGRAM

## 2024-07-21 PROCEDURE — 36415 COLL VENOUS BLD VENIPUNCTURE: CPT | Performed by: STUDENT IN AN ORGANIZED HEALTH CARE EDUCATION/TRAINING PROGRAM

## 2024-07-21 PROCEDURE — 96361 HYDRATE IV INFUSION ADD-ON: CPT

## 2024-07-21 PROCEDURE — 99285 EMERGENCY DEPT VISIT HI MDM: CPT | Mod: 25

## 2024-07-21 PROCEDURE — 96374 THER/PROPH/DIAG INJ IV PUSH: CPT | Mod: 59

## 2024-07-21 PROCEDURE — 2500000002 HC RX 250 W HCPCS SELF ADMINISTERED DRUGS (ALT 637 FOR MEDICARE OP, ALT 636 FOR OP/ED): Performed by: STUDENT IN AN ORGANIZED HEALTH CARE EDUCATION/TRAINING PROGRAM

## 2024-07-21 PROCEDURE — 70450 CT HEAD/BRAIN W/O DYE: CPT | Performed by: RADIOLOGY

## 2024-07-21 PROCEDURE — 2500000004 HC RX 250 GENERAL PHARMACY W/ HCPCS (ALT 636 FOR OP/ED): Performed by: STUDENT IN AN ORGANIZED HEALTH CARE EDUCATION/TRAINING PROGRAM

## 2024-07-21 PROCEDURE — 74177 CT ABD & PELVIS W/CONTRAST: CPT

## 2024-07-21 PROCEDURE — 70450 CT HEAD/BRAIN W/O DYE: CPT

## 2024-07-21 PROCEDURE — 87086 URINE CULTURE/COLONY COUNT: CPT | Mod: PORLAB | Performed by: STUDENT IN AN ORGANIZED HEALTH CARE EDUCATION/TRAINING PROGRAM

## 2024-07-21 PROCEDURE — 96375 TX/PRO/DX INJ NEW DRUG ADDON: CPT

## 2024-07-21 PROCEDURE — 81003 URINALYSIS AUTO W/O SCOPE: CPT | Performed by: STUDENT IN AN ORGANIZED HEALTH CARE EDUCATION/TRAINING PROGRAM

## 2024-07-21 PROCEDURE — 83690 ASSAY OF LIPASE: CPT | Performed by: STUDENT IN AN ORGANIZED HEALTH CARE EDUCATION/TRAINING PROGRAM

## 2024-07-21 PROCEDURE — 80053 COMPREHEN METABOLIC PANEL: CPT | Performed by: STUDENT IN AN ORGANIZED HEALTH CARE EDUCATION/TRAINING PROGRAM

## 2024-07-21 PROCEDURE — 74177 CT ABD & PELVIS W/CONTRAST: CPT | Performed by: RADIOLOGY

## 2024-07-21 PROCEDURE — 85025 COMPLETE CBC W/AUTO DIFF WBC: CPT | Performed by: STUDENT IN AN ORGANIZED HEALTH CARE EDUCATION/TRAINING PROGRAM

## 2024-07-21 RX ORDER — DROPERIDOL 2.5 MG/ML
1.25 INJECTION, SOLUTION INTRAMUSCULAR; INTRAVENOUS EVERY 6 HOURS PRN
Status: DISCONTINUED | OUTPATIENT
Start: 2024-07-21 | End: 2024-07-21 | Stop reason: HOSPADM

## 2024-07-21 RX ORDER — ONDANSETRON 4 MG/1
4 TABLET, ORALLY DISINTEGRATING ORAL EVERY 8 HOURS PRN
Qty: 20 TABLET | Refills: 0 | Status: SHIPPED | OUTPATIENT
Start: 2024-07-21 | End: 2024-07-28

## 2024-07-21 RX ORDER — SULFAMETHOXAZOLE AND TRIMETHOPRIM 800; 160 MG/1; MG/1
1 TABLET ORAL 2 TIMES DAILY
Qty: 20 TABLET | Refills: 0 | Status: SHIPPED | OUTPATIENT
Start: 2024-07-21 | End: 2024-07-31

## 2024-07-21 RX ORDER — MAGNESIUM SULFATE HEPTAHYDRATE 40 MG/ML
2 INJECTION, SOLUTION INTRAVENOUS ONCE
Status: COMPLETED | OUTPATIENT
Start: 2024-07-21 | End: 2024-07-21

## 2024-07-21 RX ORDER — SULFAMETHOXAZOLE AND TRIMETHOPRIM 800; 160 MG/1; MG/1
1 TABLET ORAL ONCE
Status: COMPLETED | OUTPATIENT
Start: 2024-07-21 | End: 2024-07-21

## 2024-07-21 RX ORDER — PROCHLORPERAZINE EDISYLATE 5 MG/ML
10 INJECTION INTRAMUSCULAR; INTRAVENOUS ONCE
Status: COMPLETED | OUTPATIENT
Start: 2024-07-21 | End: 2024-07-21

## 2024-07-21 RX ORDER — KETOROLAC TROMETHAMINE 30 MG/ML
15 INJECTION, SOLUTION INTRAMUSCULAR; INTRAVENOUS ONCE
Status: COMPLETED | OUTPATIENT
Start: 2024-07-21 | End: 2024-07-21

## 2024-07-21 RX ORDER — ACETAMINOPHEN 500 MG
1000 TABLET ORAL EVERY 6 HOURS PRN
Qty: 30 TABLET | Refills: 0 | Status: SHIPPED | OUTPATIENT
Start: 2024-07-21 | End: 2024-07-31

## 2024-07-21 RX ORDER — ACETAMINOPHEN 325 MG/1
975 TABLET ORAL ONCE
Status: COMPLETED | OUTPATIENT
Start: 2024-07-21 | End: 2024-07-21

## 2024-07-21 ASSESSMENT — PAIN SCALES - GENERAL
PAINLEVEL_OUTOF10: 2
PAINLEVEL_OUTOF10: 10 - WORST POSSIBLE PAIN
PAINLEVEL_OUTOF10: 0 - NO PAIN

## 2024-07-21 ASSESSMENT — COLUMBIA-SUICIDE SEVERITY RATING SCALE - C-SSRS
1. IN THE PAST MONTH, HAVE YOU WISHED YOU WERE DEAD OR WISHED YOU COULD GO TO SLEEP AND NOT WAKE UP?: NO
6. HAVE YOU EVER DONE ANYTHING, STARTED TO DO ANYTHING, OR PREPARED TO DO ANYTHING TO END YOUR LIFE?: NO
2. HAVE YOU ACTUALLY HAD ANY THOUGHTS OF KILLING YOURSELF?: NO

## 2024-07-21 ASSESSMENT — PAIN - FUNCTIONAL ASSESSMENT
PAIN_FUNCTIONAL_ASSESSMENT: 0-10

## 2024-07-21 ASSESSMENT — LIFESTYLE VARIABLES
HAVE YOU EVER FELT YOU SHOULD CUT DOWN ON YOUR DRINKING: NO
EVER FELT BAD OR GUILTY ABOUT YOUR DRINKING: NO
EVER HAD A DRINK FIRST THING IN THE MORNING TO STEADY YOUR NERVES TO GET RID OF A HANGOVER: NO
HAVE PEOPLE ANNOYED YOU BY CRITICIZING YOUR DRINKING: NO
TOTAL SCORE: 0

## 2024-07-21 NOTE — DISCHARGE INSTRUCTIONS
Take medications as prescribed.  Come back to the ED for any new or worsening symptoms such as worsening pain or developing fever.

## 2024-07-21 NOTE — ED PROVIDER NOTES
HPI   Chief Complaint   Patient presents with    Headache     X 5 days       48-year-old female with past medical history of rheumatoid arthritis, anxiety, hepatitis C presents to ED with concerns for headache and abdominal pain.  Patient's has been having a progressive headache for the last 5 days.  She has a headache is on the top of her head and goes to the back of the head.  Worsening pain with bright lights.  Denies any recent falls or head trauma.  Patient's had headaches in this distribution before however today's is the worst it has been.  No thunderclap nature.  No vision changes, weakness or numbness to extremities.  No fevers or neck pain.    Patient also complaining of right lower flank pain that started this morning.  Also having lower abdominal pain.  No nausea vomiting or diarrhea.  No dysuria, hematuria or frequency.  No abnormal vaginal discharge or bleeding.              Patient History   Past Medical History:   Diagnosis Date    Anxiety disorder, unspecified     Anxiety    Constipation     Dysphagia     Encounter for gynecological examination (general) (routine) without abnormal findings     Encounter for gynecological examination without abnormal finding    Encounter for screening mammogram for malignant neoplasm of breast     Visit for screening mammogram    Gastric ulcer     Hyperlipidemia     Infectious viral hepatitis     MRSA (methicillin resistant Staphylococcus aureus)     Other chronic pain     Chronic pain    Other conditions influencing health status     Traumatic Fracture    Personal history of diseases of the blood and blood-forming organs and certain disorders involving the immune mechanism     History of anemia    Personal history of other diseases of the digestive system 12/29/2021    History of duodenal ulcer    Personal history of other diseases of the musculoskeletal system and connective tissue     History of fibromyalgia    Personal history of other diseases of the  musculoskeletal system and connective tissue     History of degenerative disc disease    Personal history of other diseases of the musculoskeletal system and connective tissue     History of rheumatoid arthritis    Personal history of other diseases of the respiratory system     Personal history of asthma    Personal history of other endocrine, nutritional and metabolic disease     History of hyperlipidemia    Personal history of other mental and behavioral disorders     History of depression    Rectal bleeding     SOB (shortness of breath)      Past Surgical History:   Procedure Laterality Date     SECTION, CLASSIC  2017     Section    CHOLECYSTECTOMY      COLONOSCOPY  2013    Complete Colonoscopy    ELBOW SURGERY  2013    Elbow Surgery    HYSTERECTOMY  2013    Hysterectomy    OTHER SURGICAL HISTORY  2022    Oral surgery    OTHER SURGICAL HISTORY  2022    Shoulder surgery     Family History   Problem Relation Name Age of Onset    Depression Mother      Atrial fibrillation Father      Cancer Father      Depression Brother      Other (cardiac disorder) Maternal Grandmother      Colon cancer Paternal Grandmother      Diabetes Other uncle      Social History     Tobacco Use    Smoking status: Every Day     Current packs/day: 0.50     Average packs/day: 0.5 packs/day for 26.0 years (13.0 ttl pk-yrs)     Types: Cigarettes    Smokeless tobacco: Never   Substance Use Topics    Alcohol use: Not Currently    Drug use: Yes     Types: Marijuana     Comment: OCCASIONAL       Physical Exam   ED Triage Vitals [24 1613]   Temperature Heart Rate Respirations BP   36.5 °C (97.7 °F) (!) 114 18 108/66      Pulse Ox Temp Source Heart Rate Source Patient Position   100 % Tympanic Monitor --      BP Location FiO2 (%)     -- --       Physical Exam  Vitals and nursing note reviewed.   Constitutional:       General: She is not in acute distress.     Appearance: She is well-developed.    HENT:      Head: Normocephalic and atraumatic.   Eyes:      Conjunctiva/sclera: Conjunctivae normal.   Cardiovascular:      Rate and Rhythm: Normal rate and regular rhythm.      Heart sounds: No murmur heard.  Pulmonary:      Effort: Pulmonary effort is normal. No respiratory distress.      Breath sounds: Normal breath sounds.   Abdominal:      Palpations: Abdomen is soft.      Tenderness: There is abdominal tenderness.      Comments: Lower abdominal tenderness with no rebound or guarding   Musculoskeletal:         General: No swelling.      Cervical back: Neck supple.   Skin:     General: Skin is warm and dry.      Capillary Refill: Capillary refill takes less than 2 seconds.   Neurological:      Mental Status: She is alert.      Cranial Nerves: No cranial nerve deficit, dysarthria or facial asymmetry.      Sensory: No sensory deficit.      Motor: Weakness present.      Gait: Gait normal.   Psychiatric:         Mood and Affect: Mood normal.           ED Course & MDM   Diagnoses as of 07/21/24 1948   Pyelonephritis                       [place] Coma Scale Score: 15                        Medical Decision Making  HISTORIAN:  Patient    CHART REVIEW:  No pertinent findings    PT SUMMARY:  48-year-old female presents ED with headache and right flank pain.  Vital signs stable.    DDX:  Primary headache, SAH, meningitis, intracranial mass, cerebral venous thrombosis, Intracranial hypertension    Renal stone, pyelonephritis, MSK pain, PNA, PE      PLAN:  Will obtain a CBC, CMP, lipase, UA, CT abdomen pelvis, CT brain    DISPO/RE-EVAL:  Imaging shows signs of perinephric fat stranding.  CBC shows no leukocytosis.  CMP and lipase show no significant abnormalities.  UA does show signs of infection.  CT brain negative.  Patient received IV Zofran, Toradol, Compazine and magnesium on reevaluation her symptoms are improved.  I suspect her symptoms are partially due to pyelonephritis.  She is able to tolerate p.o.  Since she  overall looks well and symptoms are improved I believe she is stable to be discharged home.  Will start patient on oral Bactrim.  Will have patient follow-up as soon as possible with primary care.  Advised to come back to the ED for any new or worsening symptoms.          Procedure  Procedures     Orlando Stack DO  07/21/24 1950

## 2024-07-22 LAB — HOLD SPECIMEN: NORMAL

## 2024-07-24 LAB — BACTERIA UR CULT: ABNORMAL

## 2024-07-26 ENCOUNTER — TELEPHONE (OUTPATIENT)
Dept: PHARMACY | Facility: HOSPITAL | Age: 49
End: 2024-07-26
Payer: COMMERCIAL

## 2024-07-26 DIAGNOSIS — N10 ACUTE PYELONEPHRITIS: Primary | ICD-10-CM

## 2024-07-26 RX ORDER — AMOXICILLIN AND CLAVULANATE POTASSIUM 875; 125 MG/1; MG/1
875 TABLET, FILM COATED ORAL 2 TIMES DAILY
Qty: 20 TABLET | Refills: 0 | Status: SHIPPED | OUTPATIENT
Start: 2024-07-26 | End: 2024-08-05

## 2024-07-26 NOTE — PROGRESS NOTES
EDPD Note: Bug-Drug Mismatch    Contacted Ms. Lake ARELIS Billy regarding a positive urine culture/result that was taken during their recent emergency room visit. I completed education with patient. The patient is not being treated appropriately with Bactrim DS BID x 10 days for Pyelonephritis per susceptibilities.     Patient presented to the ED for progressive headache and abdominal pain. Did not endorse urinary symptoms but reported right lower flank pain in the ED note.   Patient reports she normally has urinary odor when she gets UTIs. She may have been urinary frequency and discoloration and suprapubic, mostly right flank pain, fever, chills, some nausea. Verified patient's allergies to antibiotics and she reports that Augmentin has been tolerated in the past without any yeast infections associated with itching. Counseled the patient to follow-up with her provider if her symptoms persist/worsen/return or if she needs a script for yeast infection.    The following prescription was sent to the patient's preferred pharmacy. No further follow up needed from EDPD Team.   Drug: Augmentin 875-125mg  Si tab PO BID x 10 days  Days Supply: 10    Susceptibility data from last 90 days.  Collected Specimen Info Organism Amoxicillin/Clavulanate Ampicillin Ampicillin/Sulbactam Cefazolin Cefazolin (uncomplicated UTIs only) Ciprofloxacin Gentamicin Nitrofurantoin Piperacillin/Tazobactam Tobramycin   24 Urine from Clean Catch/Voided Escherichia coli  S  R  I  S  S  S  R  S  S  I     Collected Specimen Info Organism Trimethoprim/Sulfamethoxazole   24 Urine from Clean Catch/Voided Escherichia coli  R       Kiera Cassidy, PharmD

## 2024-08-19 ENCOUNTER — HOSPITAL ENCOUNTER (OUTPATIENT)
Dept: RADIOLOGY | Facility: HOSPITAL | Age: 49
Discharge: HOME | End: 2024-08-19
Payer: COMMERCIAL

## 2024-08-19 DIAGNOSIS — N28.9 DISORDER OF KIDNEY AND URETER, UNSPECIFIED: ICD-10-CM

## 2024-08-19 PROCEDURE — 76770 US EXAM ABDO BACK WALL COMP: CPT | Performed by: RADIOLOGY

## 2024-08-19 PROCEDURE — 76770 US EXAM ABDO BACK WALL COMP: CPT

## 2024-09-25 ENCOUNTER — OFFICE VISIT (OUTPATIENT)
Dept: URGENT CARE | Age: 49
End: 2024-09-25
Payer: COMMERCIAL

## 2024-09-25 VITALS
SYSTOLIC BLOOD PRESSURE: 97 MMHG | DIASTOLIC BLOOD PRESSURE: 41 MMHG | HEART RATE: 71 BPM | TEMPERATURE: 97.7 F | OXYGEN SATURATION: 98 %

## 2024-09-25 DIAGNOSIS — J02.9 SORE THROAT: ICD-10-CM

## 2024-09-25 DIAGNOSIS — B97.89 VIRAL SINUSITIS: Primary | ICD-10-CM

## 2024-09-25 DIAGNOSIS — J32.9 VIRAL SINUSITIS: Primary | ICD-10-CM

## 2024-09-25 LAB
POC RAPID STREP: NEGATIVE
POC SARS-COV-2 AG BINAX: NORMAL

## 2024-09-25 RX ORDER — FLUTICASONE PROPIONATE 50 MCG
1 SPRAY, SUSPENSION (ML) NASAL 2 TIMES DAILY
Qty: 16 G | Refills: 0 | Status: SHIPPED | OUTPATIENT
Start: 2024-09-25 | End: 2025-09-25

## 2024-09-25 ASSESSMENT — ENCOUNTER SYMPTOMS
COUGH: 0
SWOLLEN GLANDS: 0
DIARRHEA: 0
HOARSE VOICE: 0
SHORTNESS OF BREATH: 0
SORE THROAT: 1
ABDOMINAL PAIN: 0
STRIDOR: 0
TROUBLE SWALLOWING: 1
HEADACHES: 1
NECK PAIN: 1
VOMITING: 0
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
## 2024-09-25 NOTE — PATIENT INSTRUCTIONS
Supportive care of viral sinusitis discussed.  Symptoms should improve in 7 to 10 days.  Increase fluid intake and rest as needed.  Take Tylenol and/or ibuprofen as needed for aches and pain and/or fever.  Return or follow-up with primary care provider if symptoms did not improve and/or pt developed purulent nasal discharge, worsening cough, fever, worsening sore throat, ear pain, sinus pain and headaches.  Call 911 or go to the nearest emergency room if symptoms became severe such as severe pain, shortness of breath, chest tight ness.   Patient verbalized understanding of the instructions and left in stable condition.

## 2025-01-08 ENCOUNTER — APPOINTMENT (OUTPATIENT)
Dept: RADIOLOGY | Facility: HOSPITAL | Age: 50
End: 2025-01-08
Payer: COMMERCIAL

## 2025-01-08 ENCOUNTER — HOSPITAL ENCOUNTER (EMERGENCY)
Facility: HOSPITAL | Age: 50
Discharge: HOME | End: 2025-01-08
Payer: COMMERCIAL

## 2025-01-08 VITALS
HEIGHT: 68 IN | TEMPERATURE: 97.2 F | HEART RATE: 83 BPM | OXYGEN SATURATION: 100 % | DIASTOLIC BLOOD PRESSURE: 75 MMHG | RESPIRATION RATE: 18 BRPM | SYSTOLIC BLOOD PRESSURE: 108 MMHG | BODY MASS INDEX: 28.04 KG/M2 | WEIGHT: 185 LBS

## 2025-01-08 DIAGNOSIS — S23.41XA SPRAIN OF RIBS, INITIAL ENCOUNTER: Primary | ICD-10-CM

## 2025-01-08 DIAGNOSIS — J98.11 ATELECTASIS: ICD-10-CM

## 2025-01-08 PROCEDURE — 2500000001 HC RX 250 WO HCPCS SELF ADMINISTERED DRUGS (ALT 637 FOR MEDICARE OP): Performed by: NURSE PRACTITIONER

## 2025-01-08 PROCEDURE — 71101 X-RAY EXAM UNILAT RIBS/CHEST: CPT | Mod: RIGHT SIDE | Performed by: SURGERY

## 2025-01-08 PROCEDURE — 99284 EMERGENCY DEPT VISIT MOD MDM: CPT

## 2025-01-08 PROCEDURE — 96372 THER/PROPH/DIAG INJ SC/IM: CPT | Performed by: NURSE PRACTITIONER

## 2025-01-08 PROCEDURE — 71101 X-RAY EXAM UNILAT RIBS/CHEST: CPT | Mod: RT

## 2025-01-08 PROCEDURE — 2500000004 HC RX 250 GENERAL PHARMACY W/ HCPCS (ALT 636 FOR OP/ED): Performed by: NURSE PRACTITIONER

## 2025-01-08 PROCEDURE — 2500000005 HC RX 250 GENERAL PHARMACY W/O HCPCS: Performed by: NURSE PRACTITIONER

## 2025-01-08 RX ORDER — ACETAMINOPHEN 325 MG/1
975 TABLET ORAL ONCE
Status: COMPLETED | OUTPATIENT
Start: 2025-01-08 | End: 2025-01-08

## 2025-01-08 RX ORDER — ACETAMINOPHEN 500 MG
1000 TABLET ORAL EVERY 6 HOURS PRN
Qty: 40 TABLET | Refills: 0 | Status: SHIPPED | OUTPATIENT
Start: 2025-01-08 | End: 2025-01-13

## 2025-01-08 RX ORDER — DOXYCYCLINE HYCLATE 100 MG
100 TABLET ORAL 2 TIMES DAILY
Qty: 14 TABLET | Refills: 0 | Status: SHIPPED | OUTPATIENT
Start: 2025-01-08 | End: 2025-01-15

## 2025-01-08 RX ORDER — LIDOCAINE 560 MG/1
1 PATCH PERCUTANEOUS; TOPICAL; TRANSDERMAL DAILY
Qty: 7 PATCH | Refills: 0 | Status: SHIPPED | OUTPATIENT
Start: 2025-01-08

## 2025-01-08 RX ORDER — CYCLOBENZAPRINE HCL 10 MG
10 TABLET ORAL 3 TIMES DAILY PRN
Qty: 20 TABLET | Refills: 0 | Status: SHIPPED | OUTPATIENT
Start: 2025-01-08 | End: 2025-01-15

## 2025-01-08 RX ORDER — KETOROLAC TROMETHAMINE 30 MG/ML
30 INJECTION, SOLUTION INTRAMUSCULAR; INTRAVENOUS ONCE
Status: COMPLETED | OUTPATIENT
Start: 2025-01-08 | End: 2025-01-08

## 2025-01-08 RX ORDER — ORPHENADRINE CITRATE 30 MG/ML
60 INJECTION INTRAMUSCULAR; INTRAVENOUS ONCE
Status: COMPLETED | OUTPATIENT
Start: 2025-01-08 | End: 2025-01-08

## 2025-01-08 RX ORDER — LIDOCAINE 560 MG/1
1 PATCH PERCUTANEOUS; TOPICAL; TRANSDERMAL ONCE
Status: DISCONTINUED | OUTPATIENT
Start: 2025-01-08 | End: 2025-01-08 | Stop reason: HOSPADM

## 2025-01-08 RX ADMIN — LIDOCAINE 4% 1 PATCH: 40 PATCH TOPICAL at 17:51

## 2025-01-08 RX ADMIN — KETOROLAC TROMETHAMINE 30 MG: 30 INJECTION, SOLUTION INTRAMUSCULAR at 17:44

## 2025-01-08 RX ADMIN — ORPHENADRINE CITRATE 60 MG: 60 INJECTION INTRAMUSCULAR; INTRAVENOUS at 17:51

## 2025-01-08 RX ADMIN — ACETAMINOPHEN 975 MG: 325 TABLET ORAL at 17:45

## 2025-01-08 ASSESSMENT — PAIN DESCRIPTION - LOCATION: LOCATION: RIB CAGE

## 2025-01-08 ASSESSMENT — LIFESTYLE VARIABLES
EVER HAD A DRINK FIRST THING IN THE MORNING TO STEADY YOUR NERVES TO GET RID OF A HANGOVER: NO
HAVE YOU EVER FELT YOU SHOULD CUT DOWN ON YOUR DRINKING: NO
EVER FELT BAD OR GUILTY ABOUT YOUR DRINKING: NO
TOTAL SCORE: 0
HAVE PEOPLE ANNOYED YOU BY CRITICIZING YOUR DRINKING: NO

## 2025-01-08 ASSESSMENT — VISUAL ACUITY: OU: 1

## 2025-01-08 ASSESSMENT — PAIN DESCRIPTION - PAIN TYPE: TYPE: ACUTE PAIN

## 2025-01-08 ASSESSMENT — COLUMBIA-SUICIDE SEVERITY RATING SCALE - C-SSRS
6. HAVE YOU EVER DONE ANYTHING, STARTED TO DO ANYTHING, OR PREPARED TO DO ANYTHING TO END YOUR LIFE?: NO
1. IN THE PAST MONTH, HAVE YOU WISHED YOU WERE DEAD OR WISHED YOU COULD GO TO SLEEP AND NOT WAKE UP?: NO
2. HAVE YOU ACTUALLY HAD ANY THOUGHTS OF KILLING YOURSELF?: NO

## 2025-01-08 ASSESSMENT — PAIN SCALES - GENERAL
PAINLEVEL_OUTOF10: 8
PAINLEVEL_OUTOF10: 8

## 2025-01-08 ASSESSMENT — PAIN - FUNCTIONAL ASSESSMENT: PAIN_FUNCTIONAL_ASSESSMENT: 0-10

## 2025-01-08 ASSESSMENT — PAIN DESCRIPTION - ORIENTATION: ORIENTATION: RIGHT
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
## 2025-01-08 NOTE — Clinical Note
Jaya Billy was seen and treated in our emergency department on 1/8/2025.  She may return to work on 01/10/2025.       If you have any questions or concerns, please don't hesitate to call.      Chantelle Courtney, APRN-CNP

## 2025-01-09 NOTE — DISCHARGE INSTRUCTIONS
There is a prescription for antibiotic in the back of the packet.  Should you start to develop signs and symptoms indicative of pneumonia, tear off the prescription and take it to the pharmacy.  Start the antibiotics and be sure to get seen by a provider for reevaluation.

## 2025-01-10 ENCOUNTER — OFFICE VISIT (OUTPATIENT)
Dept: URGENT CARE | Age: 50
End: 2025-01-10
Payer: COMMERCIAL

## 2025-01-10 VITALS
SYSTOLIC BLOOD PRESSURE: 108 MMHG | RESPIRATION RATE: 16 BRPM | DIASTOLIC BLOOD PRESSURE: 67 MMHG | TEMPERATURE: 97 F | HEART RATE: 82 BPM | OXYGEN SATURATION: 97 %

## 2025-01-10 DIAGNOSIS — Z20.822 SUSPECTED COVID-19 VIRUS INFECTION: ICD-10-CM

## 2025-01-10 DIAGNOSIS — A08.4 VIRAL GASTROENTERITIS: Primary | ICD-10-CM

## 2025-01-10 DIAGNOSIS — R19.7 NAUSEA VOMITING AND DIARRHEA: ICD-10-CM

## 2025-01-10 DIAGNOSIS — R11.2 NAUSEA VOMITING AND DIARRHEA: ICD-10-CM

## 2025-01-10 LAB
POC RAPID INFLUENZA A: NEGATIVE
POC RAPID INFLUENZA B: NEGATIVE
POC SARS-COV-2 AG BINAX: NORMAL

## 2025-01-10 RX ORDER — ONDANSETRON 4 MG/1
4 TABLET, ORALLY DISINTEGRATING ORAL EVERY 8 HOURS PRN
Qty: 20 TABLET | Refills: 0 | Status: SHIPPED | OUTPATIENT
Start: 2025-01-10 | End: 2025-01-17

## 2025-01-10 ASSESSMENT — ENCOUNTER SYMPTOMS
ACTIVITY CHANGE: 1
RESPIRATORY NEGATIVE: 1
DIARRHEA: 1
NAUSEA: 1
CARDIOVASCULAR NEGATIVE: 1
APPETITE CHANGE: 1
VOMITING: 1

## 2025-01-10 NOTE — LETTER
January 10, 2025     Patient: Jaya Billy   YOB: 1975   Date of Visit: 1/10/2025       To Whom It May Concern:    Jaya Billy was seen in my clinic on 1/10/2025. Please excuse Jaya for her absence from work on this day to make the appointment.    If you have any questions or concerns, please don't hesitate to call.         Sincerely,         Efren Stewart, APRN-CNP
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
## 2025-02-03 ENCOUNTER — HOSPITAL ENCOUNTER (OUTPATIENT)
Dept: RADIOLOGY | Facility: HOSPITAL | Age: 50
Discharge: HOME | End: 2025-02-03
Payer: COMMERCIAL

## 2025-02-03 VITALS — WEIGHT: 185 LBS | BODY MASS INDEX: 28.04 KG/M2 | HEIGHT: 68 IN

## 2025-02-03 DIAGNOSIS — Z12.31 ENCOUNTER FOR SCREENING MAMMOGRAM FOR MALIGNANT NEOPLASM OF BREAST: ICD-10-CM

## 2025-02-03 PROCEDURE — 77067 SCR MAMMO BI INCL CAD: CPT

## 2025-02-03 PROCEDURE — 77063 BREAST TOMOSYNTHESIS BI: CPT | Performed by: RADIOLOGY

## 2025-02-03 PROCEDURE — 77067 SCR MAMMO BI INCL CAD: CPT | Performed by: RADIOLOGY

## 2025-02-10 DIAGNOSIS — Z12.11 COLON CANCER SCREENING: Primary | ICD-10-CM

## 2025-02-11 ENCOUNTER — TELEPHONE (OUTPATIENT)
Facility: CLINIC | Age: 50
End: 2025-02-11
Payer: COMMERCIAL

## 2025-02-11 NOTE — TELEPHONE ENCOUNTER
----- Message from Candice NAIR sent at 2/11/2025 10:35 AM EST -----  Regarding: RE: recall    ----- Message -----  From: Leona Merchant PA-C  Sent: 2/10/2025  11:38 AM EST  To: Candice Harp MA  Subject: RE: recall                                       done  ----- Message -----  From: Fatoumata Kaba MA  Sent: 2/10/2025  10:01 AM EST  To: Leona Merchant PA-C  Subject: recall                                           Per chart recall, pt due for repeat colonoscopy  - can you place the order for the screening colonoscopy and send to [facility] .  Thank you.    #Chart reviewed for age, BMI and anticoagulants.

## 2025-02-11 NOTE — TELEPHONE ENCOUNTER
Pt transferred to office from central scheduling. Order given to [facility]  . program explained to patient and patient verbalized understanding. Needs to do [facility] questions.

## 2025-04-11 ENCOUNTER — PREP FOR PROCEDURE (OUTPATIENT)
Facility: CLINIC | Age: 50
End: 2025-04-11
Payer: COMMERCIAL

## (undated) DEVICE — ACCESS SYS, KII SHIELDED BLADED, Z-THREAD, 5X100CM

## (undated) DEVICE — Device

## (undated) DEVICE — PREP, SCRUB, SKIN, FOAM, HIBICLENS, 4 OZ

## (undated) DEVICE — CATHETER, CHOLANGIOGRAM, 4.5 FR, 45 CM, 18 IN

## (undated) DEVICE — ACCESS SYS, KII SHIELDED BLADED, Z-THREAD, 12X100CM

## (undated) DEVICE — SLEEVE, KII, Z-THREAD, 5X100CM

## (undated) DEVICE — PREP TRAY, SKIN, DRY, W/GLOVES

## (undated) DEVICE — DRAPE, C-ARM IMAGE

## (undated) DEVICE — SCOPE WARMER, LAPAROSCOPE, BAG ONLY, LF

## (undated) DEVICE — STRIP, SKIN CLOSURE, STERI STRIP, REINFORCED, 0.5 X 4 IN

## (undated) DEVICE — PAD, GROUNDING, ELECTROSURGICAL, W/9 FT CABLE, POLYHESIVE II, ADULT, LF

## (undated) DEVICE — SOLUTION, IRRIGATION, SODIUM CHLORIDE 0.9%, 1000 ML, POUR BOTTLE

## (undated) DEVICE — SOLUTION, IRRIGATION, USP, SODIUM CHLORIDE 0.9%, 3000 ML

## (undated) DEVICE — SYRINGE, 10 CC, LUER LOCK

## (undated) DEVICE — DRESSING, TRANSPARENT, TEGADERM, 2-3/8 X 2-3/4 IN

## (undated) DEVICE — APPLIER,  LIGACLIP, ENDO ROTATE, ROTATING, 10MM 20M/L, DISP

## (undated) DEVICE — COVER, PLASTIC, MAYO STAND, 29.5IN X 55.5IN

## (undated) DEVICE — DRESSING, GAUZE, SPONGE, 8 PLY, CURITY, 2 X 2 IN, STERILE

## (undated) DEVICE — SUTURE, VICRYL, 4-0, 18 IN, UNDYED BR PS-2

## (undated) DEVICE — ADHESIVE, SKIN, MASTISOL, 2/3 CC VIAL

## (undated) DEVICE — CAUTERY, PENCIL, PUSH BUTTON, SMOKE EVAC, 70MM

## (undated) DEVICE — TROCAR, BLUNT TIP, KII, W/SEAL, 12MM X 100MM

## (undated) DEVICE — COVER HANDLE LIGHT, STERIS, BLUE, STERILE

## (undated) DEVICE — SUTURE, VICRYL, 0, 27 IN, UR-6, VIOLET

## (undated) DEVICE — GLOVE, SURGICAL, PROTEXIS PI BLUE W/NEUTHERA, 7.5, PF, LF

## (undated) DEVICE — SYRINGE, HYPODERMIC, CONTROL, LUER LOCK, 10 CC, PLASTIC, STERILE

## (undated) DEVICE — NEEDLE, HYPODERMIC, REGULAR WALL, REGULAR BEVEL, 25 G X 1.5 IN

## (undated) DEVICE — ASSEMBLY, STRYKER FLOW 2, SUCTION IRRIGATOR, WITH TIP

## (undated) DEVICE — CABLE, ELECTROSURGICAL, MONOPOLAR, LAPAROSCOPIC, 10 FT, LF